# Patient Record
Sex: FEMALE | Race: WHITE | HISPANIC OR LATINO | Employment: FULL TIME | URBAN - METROPOLITAN AREA
[De-identification: names, ages, dates, MRNs, and addresses within clinical notes are randomized per-mention and may not be internally consistent; named-entity substitution may affect disease eponyms.]

---

## 2017-08-22 ENCOUNTER — HOSPITAL ENCOUNTER (EMERGENCY)
Facility: HOSPITAL | Age: 47
Discharge: HOME | End: 2017-08-22
Admitting: EMERGENCY MEDICINE

## 2017-08-22 VITALS
SYSTOLIC BLOOD PRESSURE: 132 MMHG | RESPIRATION RATE: 18 BRPM | HEART RATE: 59 BPM | OXYGEN SATURATION: 99 % | TEMPERATURE: 98.4 F | DIASTOLIC BLOOD PRESSURE: 87 MMHG | WEIGHT: 147.05 LBS

## 2017-08-22 DIAGNOSIS — R21 RASH AND OTHER NONSPECIFIC SKIN ERUPTION: Primary | ICD-10-CM

## 2017-08-22 PROCEDURE — 99282 EMERGENCY DEPT VISIT SF MDM: CPT

## 2017-08-22 RX ORDER — DIPHENHYDRAMINE HCL 25 MG
25 TABLET ORAL EVERY 6 HOURS PRN
Qty: 30 TABLET | Refills: 0 | Status: SHIPPED | OUTPATIENT
Start: 2017-08-22 | End: 2017-09-18

## 2017-09-18 ENCOUNTER — APPOINTMENT (EMERGENCY)
Dept: RADIOLOGY | Facility: HOSPITAL | Age: 47
End: 2017-09-18

## 2017-09-18 ENCOUNTER — HOSPITAL ENCOUNTER (EMERGENCY)
Facility: HOSPITAL | Age: 47
Discharge: HOME | End: 2017-09-19
Attending: EMERGENCY MEDICINE | Admitting: EMERGENCY MEDICINE

## 2017-09-18 DIAGNOSIS — R10.9 ABDOMINAL PAIN: Primary | ICD-10-CM

## 2017-09-18 DIAGNOSIS — R07.9 CHEST PAIN: ICD-10-CM

## 2017-09-18 LAB
ALBUMIN SERPL BCP-MCNC: 3.6 G/DL (ref 3.5–5)
ALP SERPL-CCNC: 56 U/L (ref 46–116)
ALT SERPL W P-5'-P-CCNC: 81 U/L (ref 12–78)
ANION GAP SERPL CALCULATED.3IONS-SCNC: 7 MMOL/L (ref 4–13)
AST SERPL W P-5'-P-CCNC: 69 U/L (ref 5–45)
BASOPHILS # BLD AUTO: 0.02 THOUSANDS/ΜL (ref 0–0.1)
BASOPHILS NFR BLD AUTO: 0 % (ref 0–1)
BILIRUB SERPL-MCNC: 0.43 MG/DL (ref 0.2–1)
BILIRUB UR QL STRIP: NEGATIVE
BUN SERPL-MCNC: 13 MG/DL (ref 5–25)
CALCIUM SERPL-MCNC: 9 MG/DL (ref 8.3–10.1)
CHLORIDE SERPL-SCNC: 103 MMOL/L (ref 100–108)
CLARITY UR: ABNORMAL
CO2 SERPL-SCNC: 28 MMOL/L (ref 21–32)
COLOR UR: YELLOW
COLOR, POC: YELLOW
CREAT SERPL-MCNC: 0.9 MG/DL (ref 0.6–1.3)
EOSINOPHIL # BLD AUTO: 0.1 THOUSAND/ΜL (ref 0–0.61)
EOSINOPHIL NFR BLD AUTO: 1 % (ref 0–6)
ERYTHROCYTE [DISTWIDTH] IN BLOOD BY AUTOMATED COUNT: 12.2 % (ref 11.6–15.1)
GFR SERPL CREATININE-BSD FRML MDRD: 77 ML/MIN/1.73SQ M
GLUCOSE SERPL-MCNC: 90 MG/DL (ref 65–140)
GLUCOSE UR STRIP-MCNC: NEGATIVE MG/DL
HCT VFR BLD AUTO: 40.6 % (ref 34.8–46.1)
HGB BLD-MCNC: 14.1 G/DL (ref 11.5–15.4)
HGB UR QL STRIP.AUTO: ABNORMAL
KETONES UR STRIP-MCNC: NEGATIVE MG/DL
LEUKOCYTE ESTERASE UR QL STRIP: NEGATIVE
LYMPHOCYTES # BLD AUTO: 3.06 THOUSANDS/ΜL (ref 0.6–4.47)
LYMPHOCYTES NFR BLD AUTO: 43 % (ref 14–44)
MCH RBC QN AUTO: 32 PG (ref 26.8–34.3)
MCHC RBC AUTO-ENTMCNC: 34.7 G/DL (ref 31.4–37.4)
MCV RBC AUTO: 92 FL (ref 82–98)
MONOCYTES # BLD AUTO: 0.55 THOUSAND/ΜL (ref 0.17–1.22)
MONOCYTES NFR BLD AUTO: 8 % (ref 4–12)
NEUTROPHILS # BLD AUTO: 3.44 THOUSANDS/ΜL (ref 1.85–7.62)
NEUTS SEG NFR BLD AUTO: 48 % (ref 43–75)
NITRITE UR QL STRIP: NEGATIVE
NRBC BLD AUTO-RTO: 0 /100 WBCS
PH UR STRIP.AUTO: 7.5 [PH] (ref 4.5–8)
PLATELET # BLD AUTO: 185 THOUSANDS/UL (ref 149–390)
PMV BLD AUTO: 10.6 FL (ref 8.9–12.7)
POTASSIUM SERPL-SCNC: 3.8 MMOL/L (ref 3.5–5.3)
PROT SERPL-MCNC: 7.9 G/DL (ref 6.4–8.2)
PROT UR STRIP-MCNC: ABNORMAL MG/DL
RBC # BLD AUTO: 4.41 MILLION/UL (ref 3.81–5.12)
SODIUM SERPL-SCNC: 138 MMOL/L (ref 136–145)
SP GR UR STRIP.AUTO: 1.02 (ref 1–1.03)
SPECIMEN SOURCE: NORMAL
TROPONIN I BLD-MCNC: 0 NG/ML (ref 0–0.08)
UROBILINOGEN UR QL STRIP.AUTO: 1 E.U./DL
WBC # BLD AUTO: 7.18 THOUSAND/UL (ref 4.31–10.16)

## 2017-09-18 PROCEDURE — 85652 RBC SED RATE AUTOMATED: CPT | Performed by: EMERGENCY MEDICINE

## 2017-09-18 PROCEDURE — 81002 URINALYSIS NONAUTO W/O SCOPE: CPT | Performed by: EMERGENCY MEDICINE

## 2017-09-18 PROCEDURE — 81001 URINALYSIS AUTO W/SCOPE: CPT

## 2017-09-18 PROCEDURE — 74176 CT ABD & PELVIS W/O CONTRAST: CPT

## 2017-09-18 PROCEDURE — 84484 ASSAY OF TROPONIN QUANT: CPT

## 2017-09-18 PROCEDURE — 93005 ELECTROCARDIOGRAM TRACING: CPT

## 2017-09-18 PROCEDURE — 96361 HYDRATE IV INFUSION ADD-ON: CPT

## 2017-09-18 PROCEDURE — 80053 COMPREHEN METABOLIC PANEL: CPT | Performed by: EMERGENCY MEDICINE

## 2017-09-18 PROCEDURE — 36415 COLL VENOUS BLD VENIPUNCTURE: CPT | Performed by: EMERGENCY MEDICINE

## 2017-09-18 PROCEDURE — 96374 THER/PROPH/DIAG INJ IV PUSH: CPT

## 2017-09-18 PROCEDURE — 85025 COMPLETE CBC W/AUTO DIFF WBC: CPT | Performed by: EMERGENCY MEDICINE

## 2017-09-18 RX ORDER — KETOROLAC TROMETHAMINE 30 MG/ML
15 INJECTION, SOLUTION INTRAMUSCULAR; INTRAVENOUS ONCE
Status: COMPLETED | OUTPATIENT
Start: 2017-09-18 | End: 2017-09-18

## 2017-09-18 RX ADMIN — KETOROLAC TROMETHAMINE 15 MG: 30 INJECTION, SOLUTION INTRAMUSCULAR at 23:01

## 2017-09-18 RX ADMIN — SODIUM CHLORIDE 1000 ML: 0.9 INJECTION, SOLUTION INTRAVENOUS at 23:02

## 2017-09-19 VITALS
WEIGHT: 147 LBS | RESPIRATION RATE: 16 BRPM | HEART RATE: 65 BPM | DIASTOLIC BLOOD PRESSURE: 82 MMHG | SYSTOLIC BLOOD PRESSURE: 165 MMHG | TEMPERATURE: 97.4 F | OXYGEN SATURATION: 99 %

## 2017-09-19 LAB
AMORPH URATE CRY URNS QL MICRO: NORMAL /HPF
ATRIAL RATE: 57 BPM
BACTERIA UR QL AUTO: NORMAL /HPF
ERYTHROCYTE [SEDIMENTATION RATE] IN BLOOD: 7 MM/HOUR (ref 0–20)
NON-SQ EPI CELLS URNS QL MICRO: NORMAL /HPF
P AXIS: 41 DEGREES
PR INTERVAL: 150 MS
QRS AXIS: 44 DEGREES
QRSD INTERVAL: 82 MS
QT INTERVAL: 426 MS
QTC INTERVAL: 414 MS
RBC #/AREA URNS AUTO: NORMAL /HPF
T WAVE AXIS: 33 DEGREES
VENTRICULAR RATE: 57 BPM
WBC #/AREA URNS AUTO: NORMAL /HPF

## 2017-09-19 PROCEDURE — 96361 HYDRATE IV INFUSION ADD-ON: CPT

## 2017-09-19 PROCEDURE — 99284 EMERGENCY DEPT VISIT MOD MDM: CPT

## 2017-12-28 ENCOUNTER — HOSPITAL ENCOUNTER (EMERGENCY)
Facility: HOSPITAL | Age: 47
Discharge: HOME | End: 2017-12-28
Attending: EMERGENCY MEDICINE | Admitting: EMERGENCY MEDICINE
Payer: COMMERCIAL

## 2017-12-28 VITALS
SYSTOLIC BLOOD PRESSURE: 164 MMHG | OXYGEN SATURATION: 100 % | DIASTOLIC BLOOD PRESSURE: 84 MMHG | RESPIRATION RATE: 16 BRPM | WEIGHT: 157 LBS | TEMPERATURE: 98.3 F | HEART RATE: 58 BPM

## 2017-12-28 DIAGNOSIS — Z77.29 CARBON MONOXIDE EXPOSURE: Primary | ICD-10-CM

## 2017-12-28 LAB
BASE EX.OXY STD BLDV CALC-SCNC: 80.1 % (ref 60–80)
BASE EXCESS BLDV CALC-SCNC: 0 MMOL/L
GAS + CO PNL BLDA: 2.2 % (ref 0–1.5)
HCO3 BLDV-SCNC: 25.8 MMOL/L (ref 24–30)
O2 CT BLDV-SCNC: 16.6 ML/DL
PCO2 BLDV: 46.2 MM HG (ref 42–50)
PH BLDV: 7.37 [PH] (ref 7.3–7.4)
PO2 BLDV: 46.2 MM HG (ref 35–45)

## 2017-12-28 PROCEDURE — 99283 EMERGENCY DEPT VISIT LOW MDM: CPT

## 2017-12-28 PROCEDURE — 82805 BLOOD GASES W/O2 SATURATION: CPT | Performed by: EMERGENCY MEDICINE

## 2017-12-28 PROCEDURE — 82375 ASSAY CARBOXYHB QUANT: CPT | Performed by: EMERGENCY MEDICINE

## 2017-12-28 PROCEDURE — 36415 COLL VENOUS BLD VENIPUNCTURE: CPT | Performed by: EMERGENCY MEDICINE

## 2017-12-28 NOTE — DISCHARGE INSTRUCTIONS
Carbon Monoxide Poisoning   WHAT YOU NEED TO KNOW:   Carbon monoxide (CO) poisoning is a life-threatening condition caused by exposure to high levels of CO  Your brain, organs, and tissues can be damaged from a lack of oxygen  You will need to watch for new signs and symptoms for several weeks or months after treatment  DISCHARGE INSTRUCTIONS:   Call 911 if:   · You have chest pain or an irregular or fast heartbeat  · You or someone close to you has a seizure or is unconscious  Return to the emergency department if:   · You have trouble breathing or are breathing faster than usual     · You feel like you are going to faint  · You feel weak, have trouble moving, or have severe muscle pain  · Your urine becomes dark or red  Contact your healthcare provider if:   · You feel dizzy  · You have a headache or start to vomit  · Your eyesight becomes blurred  · You have questions or concerns about your condition or care  What to do if you think you or someone else was exposed to CO:  CO poisoning can seem like the flu  Anyone who may have been exposed to CO needs to be checked by a healthcare provider  The following are steps to take if you believe you or someone else is near a source of CO:  · Move into fresh air  If safely possible, shut off the source of the CO  Wait for a professional to help you if you cannot do this safely  · Call 911  Explain when the exposure happened and how long you think it lasted  · Start CPR if needed and you are trained on how to do this  CPR may be needed if the person is not breathing  Prevent CO poisoning:   · Install a CO detector in every sleeping area in your home  Place it 5 feet above the floor and away from fireplaces or gas-burning equipment  Change the batteries twice each year  · Check your chimney, furnace, or wood stoves  Check for problems every year before you use them  Have your fireplace flue cleaned on a regular basis       · Be careful with gas appliances  Do not use barbecues or heaters that burn fuel inside your home or other closed spaces  Do not use your gas kitchen oven to heat your home  Make sure appliances are properly hooded or vented  · Do not let motor vehicles run in closed areas  This includes letting your car run in a garage  If the car is outside, check that the exhaust pipe is not blocked  · Do not smoke  Cigarette smoke contains small amounts of CO  This increases your risk of CO poisoning if you are exposed to a source of CO  Ask your healthcare provider for information if you need help quitting  Follow up with your healthcare provider as directed: You may need to return to have more tests  Write down your questions so you remember to ask them during your visits  © 2017 [address] [employer] Information is for End User's use only and may not be sold, redistributed or otherwise used for commercial purposes  All illustrations and images included in CareNotes® are the copyrighted property of A D A M , Inc  or Ray Schroeder  The above information is an  only  It is not intended as medical advice for individual conditions or treatments  Talk to your doctor, nurse or pharmacist before following any medical regimen to see if it is safe and effective for you

## 2017-12-28 NOTE — ED PROVIDER NOTES
Thank you theHistory  Chief Complaint   Patient presents with   Floydene Tomas Monoxide Exposure     [facility] was at place of residence and left them a note stating that there is high levels of carbon monoxide (300PPM)  Pt c/o headache, light headedness and chest pain     HPI    47 yof carbon monooxide exposure  [facility] called after she smelled gas for a few days  Felt lightheaded and congested  No HA  No sob  No AMS  Gas was shut off  Advised to have carbon monoxide levels checked  A/P: check carbon monoxide levels  Oxygen  None       Past Medical History:   Diagnosis Date    Patient denies medical problems        Past Surgical History:   Procedure Laterality Date    APPENDECTOMY      CHOLECYSTECTOMY      HYSTERECTOMY      OVARY SURGERY         History reviewed  No pertinent family history  I have reviewed and agree with the history as documented  Social History   Substance Use Topics    Smoking status: Never Smoker    Smokeless tobacco: Never Used    Alcohol use Yes      Comment: social        Review of Systems   Constitutional: Negative for chills, fatigue and fever  Eyes: Negative for photophobia and visual disturbance  Respiratory: Negative for cough and shortness of breath  Cardiovascular: Negative for chest pain, palpitations and leg swelling  Gastrointestinal: Negative for diarrhea, nausea and vomiting  Endocrine: Negative for polydipsia and polyuria  Genitourinary: Negative for decreased urine volume, difficulty urinating, dysuria and frequency  Musculoskeletal: Negative for back pain, neck pain and neck stiffness  Skin: Negative for color change and rash  Allergic/Immunologic: Negative for environmental allergies and immunocompromised state  Neurological: Positive for light-headedness  Negative for dizziness and headaches  Hematological: Negative for adenopathy  Does not bruise/bleed easily  Psychiatric/Behavioral: Negative for dysphoric mood   The patient is not nervous/anxious  Physical Exam  ED Triage Vitals   Temperature Pulse Respirations Blood Pressure SpO2   12/28/17 1210 12/28/17 1210 12/28/17 1210 12/28/17 1210 12/28/17 1210   98 3 °F (36 8 °C) 68 20 147/77 98 %      Temp Source Heart Rate Source Patient Position - Orthostatic VS BP Location FiO2 (%)   12/28/17 1210 12/28/17 1330 -- -- --   Oral Monitor         Pain Score       12/28/17 1210       No Pain           Orthostatic Vital Signs  Vitals:    12/28/17 1210 12/28/17 1330   BP: 147/77 164/84   Pulse: 68 58       Physical Exam   Constitutional: She is oriented to person, place, and time  She appears well-developed and well-nourished  No distress  HENT:   Head: Normocephalic and atraumatic  Nose: Nose normal    Eyes: Conjunctivae and EOM are normal  Pupils are equal, round, and reactive to light  No scleral icterus  Neck: Normal range of motion  Neck supple  No JVD present  No tracheal deviation present  No thyromegaly present  Cardiovascular: Normal rate, regular rhythm, normal heart sounds and intact distal pulses  Exam reveals no gallop and no friction rub  Pulmonary/Chest: Effort normal and breath sounds normal  No respiratory distress  She has no wheezes  She has no rales  She exhibits no tenderness  Abdominal: Soft  Bowel sounds are normal  She exhibits no distension and no mass  There is no tenderness  There is no rebound and no guarding  No hernia  Musculoskeletal: Normal range of motion  She exhibits no edema, tenderness or deformity  Neurological: She is alert and oriented to person, place, and time  She has normal reflexes  No cranial nerve deficit  Coordination normal    Skin: Skin is warm and dry  She is not diaphoretic  No erythema  Psychiatric: She has a normal mood and affect  Her behavior is normal    Nursing note and vitals reviewed        ED Medications  Medications - No data to display    Diagnostic Studies  Results Reviewed     Procedure Component Value Units Date/Time    Carboxyhemoglobin [[id number]]  (Abnormal) Collected:  12/28/17 1321    Lab Status:  Final result Specimen:  Blood from Arm, Left Updated:  12/28/17 1326     Carbon Monoxide, Blood 2 2 (H) %     Narrative:         Normal Carboxyhemoglobin range for nonsmokers is <1 5%   Normal Carboxyhemoglobin range for smokers is 1 5% to 5 1%     Blood gas, venous [[id number]]  (Abnormal) Collected:  12/28/17 1259    Lab Status:  Final result Specimen:  Blood from Arm, Left Updated:  12/28/17 1307     pH, Rolando 7 365     pCO2, Rolando 46 2 mm Hg      pO2, Rolando 46 2 (H) mm Hg      HCO3, Rolando 25 8 mmol/L      Base Excess, Rolando 0 0 mmol/L      O2 Content, Rolando 16 6 ml/dL      O2 HGB, VENOUS 80 1 (H) %                  No orders to display         Procedures  Procedures      Phone Consults  ED Phone Contact    ED Course  ED Course                                MDM  Number of Diagnoses or Management Options  Carbon monoxide exposure: new and requires workup  Diagnosis management comments: Normal carboxyhemoglobin    CritCare Time    Disposition  Final diagnoses:   Carbon monoxide exposure     Time reflects when diagnosis was documented in both MDM as applicable and the Disposition within this note     Time User Action Codes Description Comment    12/28/2017  1:33 PM Vernon Hoople Add [Z77 29] Carbon monoxide exposure       ED Disposition     ED Disposition Condition Comment    Discharge  [facility] discharge to home/self care  Condition at discharge: Follow-up Information     Follow up With Specialties Details Why Contact Toribio    Jaqueline Guy, [address] [facility]  [phone]          There are no discharge medications for this patient  No discharge procedures on file  ED Provider  Attending physically available and evaluated [facility]  I managed the patient along with the ED Attending      Electronically Signed by         Nash Martinez DO  Resident  12/30/17 6565

## 2017-12-28 NOTE — ED ATTENDING ATTESTATION
Tamia Mendiola MD, saw and evaluated the patient  I have discussed the patient with the resident/non-physician practitioner and agree with the resident's/non-physician practitioner's findings, Plan of Care, and MDM as documented in the resident's/non-physician practitioner's note, except where noted  All available labs and Radiology studies were reviewed  At this point I agree with the current assessment done in the Emergency Department  I have conducted an independent evaluation of this patient a history and physical is as follows:  Lightheaded and congestion for a couple of weeks worse past few days Pt has gas leak in house   Measurement high today Pt is nonsmoker and denies cp sob or neuro co PE; alert nad heart reg lungs clear abd soft nontender ext nad neuro nonfocal MDM: will do cohgb and O2    Critical Care Time  CritCare Time    Procedures

## 2018-01-13 ENCOUNTER — TRANSCRIBE ORDERS (OUTPATIENT)
Dept: RADIOLOGY | Facility: HOSPITAL | Age: 48
End: 2018-01-13

## 2018-01-13 ENCOUNTER — HOSPITAL ENCOUNTER (OUTPATIENT)
Dept: RADIOLOGY | Facility: HOSPITAL | Age: 48
Discharge: HOME | End: 2018-01-13

## 2018-01-13 ENCOUNTER — GENERIC CONVERSION - ENCOUNTER (OUTPATIENT)
Dept: OTHER | Facility: OTHER | Age: 48
End: 2018-01-13

## 2018-01-13 DIAGNOSIS — M25.561 RIGHT KNEE PAIN, UNSPECIFIED CHRONICITY: ICD-10-CM

## 2018-01-13 DIAGNOSIS — M25.561 RIGHT KNEE PAIN, UNSPECIFIED CHRONICITY: Primary | ICD-10-CM

## 2018-01-13 PROCEDURE — 73562 X-RAY EXAM OF KNEE 3: CPT

## 2018-01-15 ENCOUNTER — HOSPITAL ENCOUNTER (EMERGENCY)
Facility: HOSPITAL | Age: 48
Discharge: HOME | End: 2018-01-15
Attending: EMERGENCY MEDICINE | Admitting: EMERGENCY MEDICINE
Payer: COMMERCIAL

## 2018-01-15 ENCOUNTER — APPOINTMENT (EMERGENCY)
Dept: RADIOLOGY | Facility: HOSPITAL | Age: 48
End: 2018-01-15
Payer: COMMERCIAL

## 2018-01-15 VITALS
TEMPERATURE: 98.4 F | HEART RATE: 54 BPM | WEIGHT: 155 LBS | SYSTOLIC BLOOD PRESSURE: 124 MMHG | DIASTOLIC BLOOD PRESSURE: 74 MMHG | OXYGEN SATURATION: 98 % | BODY MASS INDEX: 27.46 KG/M2 | RESPIRATION RATE: 15 BRPM

## 2018-01-15 DIAGNOSIS — R07.9 CHEST PAIN: Primary | ICD-10-CM

## 2018-01-15 LAB
ALBUMIN SERPL BCP-MCNC: 4.2 G/DL (ref 3.5–5)
ALP SERPL-CCNC: 56 U/L (ref 46–116)
ALT SERPL W P-5'-P-CCNC: 124 U/L (ref 12–78)
ANION GAP SERPL CALCULATED.3IONS-SCNC: 5 MMOL/L (ref 4–13)
AST SERPL W P-5'-P-CCNC: 86 U/L (ref 5–45)
ATRIAL RATE: 64 BPM
BASOPHILS # BLD AUTO: 0.03 THOUSANDS/ΜL (ref 0–0.1)
BASOPHILS NFR BLD AUTO: 0 % (ref 0–1)
BILIRUB SERPL-MCNC: 0.46 MG/DL (ref 0.2–1)
BUN SERPL-MCNC: 14 MG/DL (ref 5–25)
CALCIUM SERPL-MCNC: 9.8 MG/DL (ref 8.3–10.1)
CHLORIDE SERPL-SCNC: 105 MMOL/L (ref 100–108)
CO2 SERPL-SCNC: 28 MMOL/L (ref 21–32)
CREAT SERPL-MCNC: 0.86 MG/DL (ref 0.6–1.3)
DEPRECATED D DIMER PPP: <220 NG/ML (FEU) (ref 0–424)
EOSINOPHIL # BLD AUTO: 0.1 THOUSAND/ΜL (ref 0–0.61)
EOSINOPHIL NFR BLD AUTO: 2 % (ref 0–6)
ERYTHROCYTE [DISTWIDTH] IN BLOOD BY AUTOMATED COUNT: 11.7 % (ref 11.6–15.1)
GFR SERPL CREATININE-BSD FRML MDRD: 81 ML/MIN/1.73SQ M
GLUCOSE SERPL-MCNC: 83 MG/DL (ref 65–140)
HCT VFR BLD AUTO: 43.6 % (ref 34.8–46.1)
HGB BLD-MCNC: 15.3 G/DL (ref 11.5–15.4)
LYMPHOCYTES # BLD AUTO: 3.08 THOUSANDS/ΜL (ref 0.6–4.47)
LYMPHOCYTES NFR BLD AUTO: 45 % (ref 14–44)
MCH RBC QN AUTO: 32.6 PG (ref 26.8–34.3)
MCHC RBC AUTO-ENTMCNC: 35.1 G/DL (ref 31.4–37.4)
MCV RBC AUTO: 93 FL (ref 82–98)
MONOCYTES # BLD AUTO: 0.59 THOUSAND/ΜL (ref 0.17–1.22)
MONOCYTES NFR BLD AUTO: 9 % (ref 4–12)
NEUTROPHILS # BLD AUTO: 3.07 THOUSANDS/ΜL (ref 1.85–7.62)
NEUTS SEG NFR BLD AUTO: 44 % (ref 43–75)
NRBC BLD AUTO-RTO: 0 /100 WBCS
P AXIS: 33 DEGREES
PLATELET # BLD AUTO: 225 THOUSANDS/UL (ref 149–390)
PMV BLD AUTO: 10.5 FL (ref 8.9–12.7)
POTASSIUM SERPL-SCNC: 3.9 MMOL/L (ref 3.5–5.3)
PR INTERVAL: 134 MS
PROT SERPL-MCNC: 8.8 G/DL (ref 6.4–8.2)
QRS AXIS: 53 DEGREES
QRSD INTERVAL: 88 MS
QT INTERVAL: 410 MS
QTC INTERVAL: 422 MS
RBC # BLD AUTO: 4.69 MILLION/UL (ref 3.81–5.12)
SODIUM SERPL-SCNC: 138 MMOL/L (ref 136–145)
T WAVE AXIS: 30 DEGREES
TROPONIN I SERPL-MCNC: <0.02 NG/ML
VENTRICULAR RATE: 64 BPM
WBC # BLD AUTO: 6.88 THOUSAND/UL (ref 4.31–10.16)

## 2018-01-15 PROCEDURE — 71046 X-RAY EXAM CHEST 2 VIEWS: CPT

## 2018-01-15 PROCEDURE — 85379 FIBRIN DEGRADATION QUANT: CPT | Performed by: EMERGENCY MEDICINE

## 2018-01-15 PROCEDURE — 85025 COMPLETE CBC W/AUTO DIFF WBC: CPT | Performed by: EMERGENCY MEDICINE

## 2018-01-15 PROCEDURE — 96374 THER/PROPH/DIAG INJ IV PUSH: CPT

## 2018-01-15 PROCEDURE — 96372 THER/PROPH/DIAG INJ SC/IM: CPT

## 2018-01-15 PROCEDURE — 84484 ASSAY OF TROPONIN QUANT: CPT | Performed by: EMERGENCY MEDICINE

## 2018-01-15 PROCEDURE — 80053 COMPREHEN METABOLIC PANEL: CPT | Performed by: EMERGENCY MEDICINE

## 2018-01-15 PROCEDURE — 93005 ELECTROCARDIOGRAM TRACING: CPT | Performed by: EMERGENCY MEDICINE

## 2018-01-15 PROCEDURE — 36415 COLL VENOUS BLD VENIPUNCTURE: CPT

## 2018-01-15 PROCEDURE — 99285 EMERGENCY DEPT VISIT HI MDM: CPT

## 2018-01-15 RX ORDER — ONDANSETRON 2 MG/ML
4 INJECTION INTRAMUSCULAR; INTRAVENOUS ONCE
Status: COMPLETED | OUTPATIENT
Start: 2018-01-15 | End: 2018-01-15

## 2018-01-15 RX ORDER — IBUPROFEN 600 MG/1
600 TABLET ORAL EVERY 6 HOURS PRN
Qty: 30 TABLET | Refills: 0 | Status: SHIPPED | OUTPATIENT
Start: 2018-01-15 | End: 2018-02-09

## 2018-01-15 RX ORDER — ASPIRIN 81 MG/1
324 TABLET, CHEWABLE ORAL ONCE
Status: COMPLETED | OUTPATIENT
Start: 2018-01-15 | End: 2018-01-15

## 2018-01-15 RX ORDER — KETOROLAC TROMETHAMINE 30 MG/ML
15 INJECTION, SOLUTION INTRAMUSCULAR; INTRAVENOUS ONCE
Status: COMPLETED | OUTPATIENT
Start: 2018-01-15 | End: 2018-01-15

## 2018-01-15 RX ADMIN — ONDANSETRON 4 MG: 2 INJECTION INTRAMUSCULAR; INTRAVENOUS at 16:01

## 2018-01-15 RX ADMIN — ASPIRIN 81 MG 324 MG: 81 TABLET ORAL at 16:03

## 2018-01-15 RX ADMIN — KETOROLAC TROMETHAMINE 15 MG: 30 INJECTION, SOLUTION INTRAMUSCULAR at 16:03

## 2018-01-15 NOTE — DISCHARGE INSTRUCTIONS

## 2018-01-15 NOTE — ED ATTENDING ATTESTATION
Coral Norman MD, saw and evaluated the patient  I have discussed the patient with the resident/non-physician practitioner and agree with the resident's/non-physician practitioner's findings, Plan of Care, and MDM as documented in the resident's/non-physician practitioner's note, except where noted  All available labs and Radiology studies were reviewed  At this point I agree with the current assessment done in the Emergency Department  I have conducted an independent evaluation of this patient a history and physical is as follows: This 41-year-old female presents with two day history of  Left upper chest wall pain radiating up to her neck  Patient describes the pain as sharp, constant, waxing and waning  Patient states taking a deep breath or movement seems to make it worse  Patient states that when she walks around she also begins to feel short of breath  Patient denies any swelling to the arms or legs, trauma to the chest, cough or fevers  On exam patient does appear uncomfortable  She has positive chest wall tenderness over the left approximately 3rd rib with regular heart sounds and clear lung sounds bilaterally  There is no cervical spine tenderness although there is some left-sided paraspinal tenderness  There is no lower extremity edema  Patient's EKG is unremarkable, will be evaluated for cardiac and PE causes  Patient disposition pending results of the studies  Patient's evaluation here is unremarkable  Patient was discharged home    Critical Care Time  CritCare Time    Procedures

## 2018-01-15 NOTE — ED PROVIDER NOTES
History  Chief Complaint   Patient presents with    Chest Pain     Pt states that "I feel really light headed and my chest is starting to hurt and it is going up into my nck " Pt c/o sharp, shooting chest pain      52-year-old female with no pertinent past medical history presents emergency department for chest pain x1 day  Patient states that when she awoke today ago she started developed left-sided chest pain with radiation to the left jaw  Patient states the pain is exacerbated with walking as well as deep inhalation  Pain is described as 7/10 localized to left chest wall with radiation to left jaw that is reproducible with palpation  Patient states she has associated nausea  Patient denies hemoptysis, unilateral leg swelling, history DVTs/PEs, history of cancers, history of immobilization recently, family history of early coronary artery disease, palpitations, dyspnea, shortness of breath, fever, chills  History provided by:  Patient      None       Past Medical History:   Diagnosis Date    Patient denies medical problems        Past Surgical History:   Procedure Laterality Date    APPENDECTOMY      CHOLECYSTECTOMY      HYSTERECTOMY      OVARY SURGERY         History reviewed  No pertinent family history  I have reviewed and agree with the history as documented  Social History   Substance Use Topics    Smoking status: Never Smoker    Smokeless tobacco: Never Used    Alcohol use Yes      Comment: social        Review of Systems   Constitutional: Negative for chills, diaphoresis, fatigue and fever  HENT: Negative for congestion, ear discharge, facial swelling, hearing loss, rhinorrhea, sinus pain, sinus pressure, sneezing, sore throat, tinnitus and trouble swallowing  Eyes: Negative for pain, discharge and redness  Respiratory: Negative for cough, choking, chest tightness, shortness of breath, wheezing and stridor  Cardiovascular: Positive for chest pain   Negative for palpitations and leg swelling  Gastrointestinal: Negative for abdominal distention, abdominal pain, blood in stool, constipation, diarrhea, nausea and vomiting  Endocrine: Negative for cold intolerance, polydipsia and polyuria  Genitourinary: Negative for difficulty urinating, dysuria, enuresis, flank pain, frequency and hematuria  Musculoskeletal: Negative for arthralgias, back pain, gait problem and neck stiffness  Skin: Negative for rash and wound  Neurological: Negative for dizziness, seizures, syncope, weakness, numbness and headaches  Hematological: Negative for adenopathy  Psychiatric/Behavioral: Negative for agitation, confusion, hallucinations, sleep disturbance and suicidal ideas  All other systems reviewed and are negative  Physical Exam  ED Triage Vitals [01/15/18 1448]   Temperature Pulse Respirations Blood Pressure SpO2   98 4 °F (36 9 °C) 71 18 156/83 100 %      Temp Source Heart Rate Source Patient Position - Orthostatic VS BP Location FiO2 (%)   Oral Monitor Sitting Left arm --      Pain Score       Worst Possible Pain           Orthostatic Vital Signs  Vitals:    01/15/18 1448 01/15/18 1600 01/15/18 1615   BP: 156/83 146/83 124/74   Pulse: 71 (!) 54 (!) 54   Patient Position - Orthostatic VS: Sitting         Physical Exam   Constitutional: She is oriented to person, place, and time  She appears well-developed and well-nourished  No distress  HENT:   Head: Normocephalic and atraumatic  Right Ear: External ear normal    Left Ear: External ear normal    Nose: No sinus tenderness  No epistaxis  Mouth/Throat: No oropharyngeal exudate  Eyes: Conjunctivae and EOM are normal  Pupils are equal, round, and reactive to light  Right eye exhibits no discharge  Left eye exhibits no discharge  Neck: No JVD present  Cardiovascular: Normal rate, regular rhythm, normal heart sounds and intact distal pulses  Exam reveals no gallop and no friction rub      No murmur heard   Pulmonary/Chest: Effort normal and breath sounds normal  No stridor  No respiratory distress  She has no wheezes  She has no rales  Abdominal: Soft  Bowel sounds are normal  She exhibits no distension and no mass  There is no tenderness  There is no rebound and no guarding  Genitourinary: Rectal exam shows guaiac negative stool  Musculoskeletal: Normal range of motion  She exhibits no edema or deformity  Arms:  Lymphadenopathy:     She has no cervical adenopathy  Neurological: She is alert and oriented to person, place, and time  Skin: Skin is warm, dry and intact  Capillary refill takes less than 2 seconds  She is not diaphoretic  Psychiatric: She has a normal mood and affect  Her speech is normal and behavior is normal  Judgment and thought content normal    Nursing note and vitals reviewed        ED Medications  Medications   aspirin chewable tablet 324 mg (324 mg Oral Given 1/15/18 1603)   ondansetron (ZOFRAN) injection 4 mg (4 mg Intravenous Given 1/15/18 1601)   ketorolac (TORADOL) injection 15 mg (15 mg Intramuscular Given 1/15/18 1603)       Diagnostic Studies  Results Reviewed     Procedure Component Value Units Date/Time    D-dimer, quantitative [[id number]]  (Normal) Collected:  01/15/18 1501    Lab Status:  Final result Specimen:  Blood from Arm, Right Updated:  01/15/18 1643     D-Dimer, Quant <220 ng/ml (FEU)     Comprehensive metabolic panel [[id number]]  (Abnormal) Collected:  01/15/18 1501    Lab Status:  Final result Specimen:  Blood from Arm, Right Updated:  01/15/18 1546     Sodium 138 mmol/L      Potassium 3 9 mmol/L      Chloride 105 mmol/L      CO2 28 mmol/L      Anion Gap 5 mmol/L      BUN 14 mg/dL      Creatinine 0 86 mg/dL      Glucose 83 mg/dL      Calcium 9 8 mg/dL      AST 86 (H) U/L       (H) U/L      Alkaline Phosphatase 56 U/L      Total Protein 8 8 (H) g/dL      Albumin 4 2 g/dL      Total Bilirubin 0 46 mg/dL      eGFR 81 ml/min/1 73sq m     Narrative: National Kidney Disease Education Program recommendations are as follows:  GFR calculation is accurate only with a steady state creatinine  Chronic Kidney disease less than 60 ml/min/1 73 sq  meters  Kidney failure less than 15 ml/min/1 73 sq  meters  Troponin I [[id number]]  (Normal) Collected:  01/15/18 1501    Lab Status:  Final result Specimen:  Blood from Arm, Right Updated:  01/15/18 1545     Troponin I <0 02 ng/mL     Narrative:         [employer] Chemistry analyzer 99% cutoff is > 0 04 ng/mL in network labs    o cTnI 99% cutoff is useful only when applied to patients in the clinical setting of myocardial ischemia  o cTnI 99% cutoff should be interpreted in the context of clinical history, ECG findings and possibly cardiac imaging to establish correct diagnosis  o cTnI 99% cutoff may be suggestive but clearly not indicative of a coronary event without the clinical setting of myocardial ischemia  CBC and differential [[id number]]  (Abnormal) Collected:  01/15/18 1501    Lab Status:  Final result Specimen:  Blood from Arm, Right Updated:  01/15/18 1518     WBC 6 88 Thousand/uL      RBC 4 69 Million/uL      Hemoglobin 15 3 g/dL      Hematocrit 43 6 %      MCV 93 fL      MCH 32 6 pg      MCHC 35 1 g/dL      RDW 11 7 %      MPV 10 5 fL      Platelets 084 Thousands/uL      nRBC 0 /100 WBCs      Neutrophils Relative 44 %      Lymphocytes Relative 45 (H) %      Monocytes Relative 9 %      Eosinophils Relative 2 %      Basophils Relative 0 %      Neutrophils Absolute 3 07 Thousands/µL      Lymphocytes Absolute 3 08 Thousands/µL      Monocytes Absolute 0 59 Thousand/µL      Eosinophils Absolute 0 10 Thousand/µL      Basophils Absolute 0 03 Thousands/µL                  X-ray chest 2 views   ED Interpretation by Shelle Kanner Espland, DO (01/15 1700)      No active pulmonary disease  Workstation performed: [id number]         Final Result by Janna Aden DO (01/15 1654)      No active pulmonary disease  Workstation performed: [id number]               Procedures  ECG 12 Lead Documentation  Date/Time: 1/15/2018 3:58 PM  Performed by: Katherine Ordoñez  Authorized by: Lupe Sheffield     Indications / Diagnosis:  Chest pain  Patient location:  ED  Previous ECG:     Previous ECG:  Compared to current    Comparison ECG info:  [id number]    Similarity:  No change    Comparison to cardiac monitor: Yes    Interpretation:     Interpretation: normal    Rate:     ECG rate:  64    ECG rate assessment: normal    Rhythm:     Rhythm: sinus rhythm    Ectopy:     Ectopy: none    QRS:     QRS axis:  Normal    QRS intervals:  Normal  Conduction:     Conduction: normal    ST segments:     ST segments:  Normal  T waves:     T waves: normal            Phone Consults  ED Phone Contact    ED Course  ED Course                                MDM  Number of Diagnoses or Management Options  Chest pain: new and requires workup  Diagnosis management comments: 41-year-old female presents with chest pain x1 day as reproducible with palpation  Heart score 1  [place],  D-dimer neg  Patient given 15 mg IV the Toradol, 324 aspirin, 4 mg IV Zofran  1   Chest wall pain  -600 Motrin Q6hrs  -f/u with PCP    CritCare Time    Disposition  Final diagnoses:   Chest pain     Time reflects when diagnosis was documented in both MDM as applicable and the Disposition within this note     Time User Action Codes Description Comment    1/15/2018  5:00 PM Melina Chaudhary Left Add [R07 9] Chest pain       ED Disposition     ED Disposition Condition Comment    Discharge  [facility] discharge to home/self care      Condition at discharge: Good        Follow-up Information     Follow up With Specialties Details Why Contact Info    Frank Ballesteros, [address] [facility],[address] [place] [address]  [phone]        Call          Patient's Medications   Discharge Prescriptions    IBUPROFEN (MOTRIN) 600 MG TABLET    Take 1 tablet by mouth every 6 (six) hours as needed for moderate pain       Start Date: 1/15/2018 End Date: --       Order Dose: 600 mg       Quantity: 30 tablet    Refills: 0     No discharge procedures on file  ED Provider  Attending physically available and evaluated Og Zhou I managed the patient along with the ED Attending      Electronically Signed by         Brice Baker DO  01/15/18 8394

## 2018-01-19 ENCOUNTER — ALLSCRIPTS OFFICE VISIT (OUTPATIENT)
Dept: OTHER | Facility: OTHER | Age: 48
End: 2018-01-19

## 2018-01-20 NOTE — PROGRESS NOTES
Assessment   1  Right knee pain (469 57) (M25 561)    Plan   Right knee pain    · Triamcinolone Acetonide 40 MG/ML Injection Suspension (Kenalog)    Discussion/Summary   Patient discussion: discussed with the patient, 30 minute visit, greater than half of the time was spent on counseling  Explained my current clinical findings to Josse today  I have some suspicion of a potential medial meniscal tear  Though, she does not have any significant mechanical symptoms at this time  She received a right knee intra-articular cortisone injection on today's visit  I'll see her back in about 3 weeks' time for clinical reassessment in this regard  If she does continue with significant discomfort of her right knee we will consider doing an MRI for further evaluation  The patient was counseled regarding diagnostic results,-- instructions for management,-- risk factor reductions,-- prognosis,-- patient and family education,-- impressions,-- risks and benefits of treatment options  Possible side effects of new medications were reviewed with the patient/guardian today  The treatment plan was reviewed with the patient/guardian  The patient/guardian understands and agrees with the treatment plan      Chief Complaint   1  Knee Pain  Right knee pain      History of Present Illness   HPI: Josse is a 44-year-old lady who's here today for evaluation of right knee pain of approximately 2 months duration  Denies any history of trauma prior to the onset of her right knee pain  Reports that this of moderate to severe intensity and is present intermittently  Mostly present on the anteromedial aspect and the medial aspect of the right knee with no significant radiation  It is made worse with twisting-type motion of her right knee and it occasionally wakes her up from sleep as well   Does report getting some swelling intermittently of her right knee but denies any associated redness or warmth of the knee or any other systemic symptoms like fever, chills  Denies any history of similar symptoms in the left knee and denies any history of skin rash or tick bites in the past  She has had a plain radiograph of the right knee and this does not reveal any acute fracture or dislocation  Review of Systems        Constitutional: No fever, no chills, feels well, no tiredness, no recent weight gain or loss  Eyes: No complaints of eyesight problems, no red eyes  ENT: no loss of hearing, no nosebleeds, no sore throat  Cardiovascular: No complaints of chest pain, no palpitations, no leg claudication or lower extremity edema  Respiratory: no compliants of shortness of breath, no wheezing, no cough  Gastrointestinal: nausea  Genitourinary: no complaints of dysuria, no incontinence  Musculoskeletal: as noted in HPI  Integumentary: no complaints of skin rash or lesion, no itching or dry skin, no skin wounds  Neurological: dizziness  Endocrine: No complaints of muscle weakness, no feelings of weakness, no frequent urination, no excessive thirst       Psychiatric: No suicidal thoughts, no anxiety, no feelings of depression  Active Problems   1  Abnormal findings on diagnostic imaging of urinary organs (793 5) (R93 49)   2  Diarrhea (787 91) (R19 7)   3  Nausea (787 02) (R11 0)   4  Pelvic and perineal pain (625 9) (R10 2)   5  Pelvic mass (789 30) (R19 00)   6  Postoperative examination (V67 00) (Z09)   7  Right knee pain (719 46) (M25 561)   8  Symptomatic menopausal or female climacteric states (627 2) (N95 1)    Past Medical History    · History of Helicobacter pylori (H  pylori) infection (041 86) (A04 8)   · Normal delivery (650) (O80,Z37  9)   · History of Summary Of Previous Pregnancies  7  (Total No )   · History of Summary Of Previous Pregnancies Para 7  (Deliveries)     The active problems and past medical history were reviewed and updated today        Surgical History    · History of Appendectomy   · History of Cholecystectomy Laparoscopic   · History of Laparoscopy With Total Hysterectomy   · History of Lysis Of Peritoneal Adhesions   · History of Salpingectomy   · History of Salpingo-oophorectomy Left Side     The surgical history was reviewed and updated today  Family History   Mother    · No pertinent family history  Grandmother    · Family history of arthritis (V17 7) (Z82 61)   · Family history of malignant neoplasm (V16 9) (Z80 9)   · Family history of osteoporosis (V17 81) (Z82 62)  Paternal Grandmother    · Family history of Ovarian Cancer (V16 41)     The family history was reviewed and updated today  Social History    · Being A Social Drinker   · Never A Smoker  The social history was reviewed and updated today  The social history was reviewed and is unchanged  Current Meds    1  Diclofenac Sodium 25 MG Oral Tablet Delayed Release; Therapy: (Recorded:19Jan2018) to Recorded   2  HM Iron TABS; Therapy: (Recorded:09Apr2014) to Recorded     The medication list was reviewed and updated today  Allergies   1  Codeine Derivatives   2  Morphine Derivatives    Vitals   Signs   Heart Rate: 65  Systolic: 789  Diastolic: 85  Height: 5 ft 3 in  Weight: 155 lb   BMI Calculated: 27 46  BSA Calculated: 1 74    Physical Exam      Right Knee: Appearance: Normal  Tenderness: diffuse anteromedial aspect-- and-- medial joint line  Flexion: painful restricted AROM 80Â° degrees  Motor: diffuse weakness  Special Test: equivocal medial Apley's Grind test-- and-- Further test was deferred due to patient's discomfort, but-- negative patellar grind,-- negative patellofemoral apprehension test,-- negative lateral Apley's Grind test,-- negative Lachman's test,-- no laxity on Valgus Stress-- and-- no laxity on Varus Stress  Constitutional - General appearance: Normal       Musculoskeletal - Gait and station: Abnormal  Gait evaluation demonstrated antalgia on the right  Cardiovascular - Pulses: Normal -- Examination of extremities for edema and/or varicosities: Normal       Skin - Skin and subcutaneous tissue: Normal       Neurologic - Cranial nerves: Normal -- Sensation: Normal -- Lower extremity peripheral neuro exam: Normal       Psychiatric - Orientation to person, place, and time: Normal -- Mood and affect: Normal       Eyes      Conjunctiva and lids: Normal        Pupils and irises: Normal        Results/Data   I personally reviewed the films/images/results in the office today  My interpretation follows  X-ray Review As noted in the history of present illness  Procedure      Procedure: Injection of the right knee joint  Indication:  Joint pain-- and-- Diagnostic  Potential complications include bleeding,-- infection-- and-- allergic reaction  Risk, benefits and alternatives were discussed with the patient  Verbal consent was obtained prior to the procedure  Alcohol, Betadine and Chlorhexidine Gluconate  was used to prep the area  ethyl chloride spray was used as a topical anesthetic  Using sterile technique, the aspiration/injection needle was then directed from a Anterolateral aspect  A 22-gauge and 1 5 inch needle was used to inject 4 mL of 1% Lidocaine-- and-- 1 mL of 40mg/mL triamcinolone  A bandage was applied  the patient tolerated the procedure well  Complications: none  Follow-up in the office in 3 week(s)  Signatures    Electronically signed by :  QUINTIN Encinas ; Jan 19 2018  1:20PM EST                       (Author)

## 2018-01-22 VITALS
DIASTOLIC BLOOD PRESSURE: 85 MMHG | BODY MASS INDEX: 27.46 KG/M2 | HEIGHT: 63 IN | HEART RATE: 65 BPM | WEIGHT: 155 LBS | SYSTOLIC BLOOD PRESSURE: 138 MMHG

## 2018-01-30 ENCOUNTER — OFFICE VISIT (OUTPATIENT)
Dept: OBGYN CLINIC | Facility: OTHER | Age: 48
End: 2018-01-30
Payer: COMMERCIAL

## 2018-01-30 VITALS
DIASTOLIC BLOOD PRESSURE: 89 MMHG | HEART RATE: 67 BPM | SYSTOLIC BLOOD PRESSURE: 134 MMHG | HEIGHT: 64 IN | BODY MASS INDEX: 26.29 KG/M2 | WEIGHT: 154 LBS

## 2018-01-30 DIAGNOSIS — M25.561 CHRONIC PAIN OF RIGHT KNEE: ICD-10-CM

## 2018-01-30 DIAGNOSIS — G89.29 CHRONIC PAIN OF RIGHT KNEE: ICD-10-CM

## 2018-01-30 DIAGNOSIS — M25.461 EFFUSION OF RIGHT KNEE: Primary | ICD-10-CM

## 2018-01-30 PROCEDURE — 99213 OFFICE O/P EST LOW 20 MIN: CPT | Performed by: ORTHOPAEDIC SURGERY

## 2018-01-30 NOTE — PROGRESS NOTES
Assessment:       1  Effusion of right knee    2  Chronic pain of right knee          Plan:        Persistent significant pain of the right knee with some effusion despite conservative management including right knee cortisone injection  We'll request a right knee MRI for further evaluation and to exclude an associated meniscal tear  Subjective:     Patient ID: Jodi Brown is a 53 y o  female  Chief Complaint:    Luke Young is here today for a follow-up of her right knee pain  She was last seen in this regard about 3 weeks ago and received a right knee cortisone injection for knee pain  She has had this pain for over 2 months  It is made worse with twisting activities and no radiation  Today, she says of the pain is more diffuse but most prominently in the medial aspect  She also reports some swelling of her right knee joint  She denies any erythema of the right knee  No other associated joint pain at this time  Her right knee pain is made worse with all types of knee movement  No other acute interval development of symptoms in this regard since the last office visit  Currently her pain severity is severe  Social History     Occupational History    Not on file  Social History Main Topics    Smoking status: Never Smoker    Smokeless tobacco: Never Used    Alcohol use Yes      Comment: social    Drug use: No    Sexual activity: Not on file      Review of Systems   Constitutional: Negative  HENT: Negative  Eyes: Negative  Respiratory: Negative  Cardiovascular: Negative  Endocrine: Negative  Skin: Negative  Allergic/Immunologic: Negative  Neurological: Negative  Psychiatric/Behavioral: Negative  Objective:     Right Knee Exam     Tenderness   Right knee tenderness location: Diffuse tenderness to palpation of the right knee even with light touch      Range of Motion   Extension: normal   Flexion:  20 abnormal     Other   Erythema: absent  Right knee sensation: Increased tenderness to palpation even with light touch  Pulse: present  Swelling: mild  Other tests: effusion present    Comments:  Further special tests of the right knee were deferred due to patient's discomfort        Physical Exam   Constitutional: She is oriented to person, place, and time  She appears well-developed  HENT:   Head: Normocephalic  Eyes: Conjunctivae are normal    Cardiovascular: Normal rate  Pulmonary/Chest: Effort normal  No respiratory distress  Musculoskeletal:        Right knee: She exhibits effusion  Neurological: She is alert and oriented to person, place, and time  Skin: Skin is warm and dry  No rash noted  No erythema  Psychiatric: She has a normal mood and affect   Judgment normal

## 2018-02-05 ENCOUNTER — HOSPITAL ENCOUNTER (OUTPATIENT)
Dept: RADIOLOGY | Facility: HOSPITAL | Age: 48
Discharge: HOME | End: 2018-02-05
Attending: ORTHOPAEDIC SURGERY
Payer: COMMERCIAL

## 2018-02-05 DIAGNOSIS — G89.29 CHRONIC PAIN OF RIGHT KNEE: ICD-10-CM

## 2018-02-05 DIAGNOSIS — M25.561 CHRONIC PAIN OF RIGHT KNEE: ICD-10-CM

## 2018-02-05 DIAGNOSIS — M25.461 EFFUSION OF RIGHT KNEE: ICD-10-CM

## 2018-02-05 PROCEDURE — 73721 MRI JNT OF LWR EXTRE W/O DYE: CPT

## 2018-02-09 ENCOUNTER — OFFICE VISIT (OUTPATIENT)
Dept: OBGYN CLINIC | Facility: OTHER | Age: 48
End: 2018-02-09
Payer: COMMERCIAL

## 2018-02-09 VITALS
BODY MASS INDEX: 26.29 KG/M2 | WEIGHT: 154 LBS | HEIGHT: 64 IN | HEART RATE: 65 BPM | SYSTOLIC BLOOD PRESSURE: 119 MMHG | DIASTOLIC BLOOD PRESSURE: 83 MMHG

## 2018-02-09 DIAGNOSIS — M25.561 CHRONIC PAIN OF RIGHT KNEE: Primary | ICD-10-CM

## 2018-02-09 DIAGNOSIS — G89.29 CHRONIC PAIN OF RIGHT KNEE: Primary | ICD-10-CM

## 2018-02-09 PROCEDURE — 99214 OFFICE O/P EST MOD 30 MIN: CPT | Performed by: ORTHOPAEDIC SURGERY

## 2018-02-09 RX ORDER — MELOXICAM 7.5 MG/1
7.5 TABLET ORAL DAILY
Qty: 14 TABLET | Refills: 1 | Status: SHIPPED | OUTPATIENT
Start: 2018-02-09 | End: 2018-03-12 | Stop reason: SDUPTHER

## 2018-02-09 RX ORDER — OMEPRAZOLE 40 MG/1
40 CAPSULE, DELAYED RELEASE ORAL DAILY
Qty: 14 CAPSULE | Refills: 1 | Status: SHIPPED | OUTPATIENT
Start: 2018-02-09 | End: 2020-09-27 | Stop reason: ALTCHOICE

## 2018-02-09 NOTE — PROGRESS NOTES
Assessment/Plan:  Assessment/Plan   Diagnoses and all orders for this visit:    Chronic pain of right knee  -     omeprazole (PriLOSEC) 40 MG capsule; Take 1 capsule (40 mg total) by mouth daily for 14 days  -     meloxicam (MOBIC) 7 5 mg tablet; Take 1 tablet (7 5 mg total) by mouth daily  -     Ambulatory referral to Physical Therapy; Future    Discussed with Belgica Ortiz that her MRI is significant for degenerative fissuring of the medial facet of the patella  Recommended intervention is formal physical therapy with focus on quad strengthening, hamstring stretching and strengthening the comma hip abductors strengthening, and core stability training to promote is stabilized gait mechanics and improved patellofemoral joint association  She will be seen for follow-up in 2 months time  If her symptoms fail to improve, or worsen, the next consideration would be for viscosupplementation  Subjective:   Patient ID: Genaro Tejada is a 53 y o  female  Belgica Ortiz is a pleasant 50-year-old female who presents today for follow-up on MRI  On today's presentation she reports continued pain with ambulation and weight-bearing, intermittent generalized swelling with increased activity, 9/10 pain  She denies bruising, numbness, tingling, or instability  She reports very minimal relief with ibuprofen as needed  And she denies any relief from her previous CS injection  The following portions of the patient's history were reviewed and updated as appropriate: allergies, current medications, past family history, past medical history, past social history, past surgical history and problem list     Review of Systems   Constitutional: Negative  HENT: Negative  Eyes: Negative  Respiratory: Negative  Cardiovascular: Negative  Gastrointestinal: Negative  Endocrine: Negative  Genitourinary: Negative  Musculoskeletal:        As noted in HPI/PE   Skin: Negative  Allergic/Immunologic: Negative      Neurological: Negative  Hematological: Negative  Psychiatric/Behavioral: Negative  Objective:  Right Knee Exam     Comments:  No obvious deformity noted  Tender to palpate over medial border of patella  Exhibits generalized sensitivity to light touch over medial aspect of knee as well as anteromedial aspect of knee joint  Active ROM:  0° to 90°, with pain beyond 30° of flexion  5/5 MMT seated knee extension, 4/5 MMT seated knee flexion     -varus stress test  -valgus stress test  -Rohit's medial compartment  -Rohit's lateral compartment            Physical Exam   Constitutional: She is oriented to person, place, and time  She appears well-developed and well-nourished  HENT:   Right Ear: External ear normal    Left Ear: External ear normal    Nose: Nose normal    Eyes: Conjunctivae and EOM are normal  Pupils are equal, round, and reactive to light  Neck: Normal range of motion  Cardiovascular: Intact distal pulses  Pulmonary/Chest: Effort normal    Neurological: She is alert and oriented to person, place, and time  Skin: Skin is warm and dry  Psychiatric: She has a normal mood and affect  Thought content normal        MRI taken 2/5/2018 significant for fissuring of the medial patellar facet

## 2018-02-20 ENCOUNTER — EVALUATION (OUTPATIENT)
Dept: PHYSICAL THERAPY | Facility: OTHER | Age: 48
End: 2018-02-20
Payer: COMMERCIAL

## 2018-02-20 DIAGNOSIS — G89.29 CHRONIC PAIN OF RIGHT KNEE: Primary | ICD-10-CM

## 2018-02-20 DIAGNOSIS — M25.561 CHRONIC PAIN OF RIGHT KNEE: Primary | ICD-10-CM

## 2018-02-20 PROCEDURE — G8979 MOBILITY GOAL STATUS: HCPCS | Performed by: PHYSICAL THERAPIST

## 2018-02-20 PROCEDURE — 97140 MANUAL THERAPY 1/> REGIONS: CPT | Performed by: PHYSICAL THERAPIST

## 2018-02-20 PROCEDURE — 97161 PT EVAL LOW COMPLEX 20 MIN: CPT | Performed by: PHYSICAL THERAPIST

## 2018-02-20 PROCEDURE — G8978 MOBILITY CURRENT STATUS: HCPCS | Performed by: PHYSICAL THERAPIST

## 2018-02-20 NOTE — PROGRESS NOTES
PT Evaluation     Today's date: 2018  Patient name: Tamia Malik  : 1970  MRN: [id number]  Referring provider: Rashaun Pinzon MD  Dx:   Encounter Diagnosis     ICD-10-CM    1  Chronic pain of right knee M25 561     G89 29                   Assessment  Impairments: abnormal gait, abnormal or restricted ROM, abnormal movement, activity intolerance, impaired balance, impaired physical strength, lacks appropriate home exercise program, pain with function and weight-bearing intolerance    Assessment details: Tamia Malik is a 53 y o  female who presents with complaints of chronic pain of right knee  (primary encounter diagnosis)  No further referral appears necessary at this time based upon examination results  Patient presents to PT with impaired strength, impaired ROM, decreased flexibility and impaired ability to complete IADLs  Prognosis is good given HEP compliance and PT 2-3x/wk  Positive prognostic indicators include positive attitude toward recovery  Please contact me if you have any questions or recommendations  Thank you for the opportunity to share in  [place] care       Understanding of Dx/Px/POC: good   Prognosis: good    Goals  Short Term:  Pt will report decreased levels of pain by at least 2 subjective ratings in 4 weeks  Pt will demonstrate improved ROM by at least 10 degrees in 4 weeks  Pt will demonstrate improved strength by 1/2 grade  Long Term:   Pt will be independent in their HEP in 8 weeks  Pt will be be pain free with IADL's  Pt will demonstrate improved FOTO score   Patient's Goals:   "I want to strengthen the muscles of the R knee"    Plan  Planned modality interventions: cryotherapy, TENS and thermotherapy: hydrocollator packs  Planned therapy interventions: abdominal trunk stabilization, balance, home exercise program, therapeutic exercise, therapeutic activities, stretching, strengthening, neuromuscular re-education, manual therapy, joint mobilization, gait training, functional ROM exercises and flexibility  Frequency: 2x week  Duration in weeks: 8  Treatment plan discussed with: patient        Subjective Evaluation    History of Present Illness  Onset date: 2017  Mechanism of injury: Patient reports the pain began approximately 5 months ago  She is a  and  so she is constantly on her feet  She told PT that her knee has progressively worsened  She said there was no single event that led to this but she believes her job and her recent weight gain  Not a recurrent problem Quality of life: fair    Pain  Current pain rating: 10  At best pain ratin  At worst pain rating: 10  Location: right knee  Quality: burning  Relieving factors: ice, relaxation and rest  Aggravating factors: stair climbing, walking, sitting and running      Diagnostic Tests  X-ray: normal  MRI studies: abnormal  Treatments  Previous treatment: chiropractic, injection treatment and medication  Current treatment: physical therapy  Patient Goals  Patient goals for therapy: decreased edema, decreased pain, improved balance, increased motion, increased strength, independence with ADLs/IADLs and return to sport/leisure activities  Patient goal: "I want to strengthen the muscles of the R knee"        Objective     Active Range of Motion   Left Ankle/Foot   Normal active range of motion    Right Ankle/Foot   Normal active range of motion    Passive Range of Motion   Left Knee   Flexion: 135 degrees   Extension: -5 degrees     Right Knee   Flexion: 90 degrees   Extension: 3 degrees   Left Ankle/Foot  Normal passive range of motion    Right Ankle/Foot  Normal passive range of motion    Mobility   Patellar Mobility:   Left Knee   WFL: medial, lateral, superior and inferior       Right Knee   WFL: superior and inferior  Hypermobile: medial   Hypomobile: lateral     Additional Mobility Details  Pain with all patellar mobs on the R     Patellar Static Positioning   Left Knee: [facility] Knee: sarah    Strength/Myotome Testing     Left Hip   Planes of Motion   Flexion: 5  Extension: 4+  Abduction: 4+    Isolated Muscles   Gluteus karine: 4+    Right Hip   Planes of Motion   Flexion: 3  Extension: 3  Abduction: 3    Isolated Muscles   Gluteus maximums: 3    Left Knee   Flexion: 3+  Extension: 5    Right Knee   Flexion: 3+  Extension: 3+    Left Ankle/Foot   Normal strength    Right Ankle/Foot   Normal strength    Tests     Left Hip   Positive Ely's  Negative piriformis  Right Hip   Positive Ely's and piriformis  General Comments     Hip Comments   R hip abd PROM is restricted    Knee Comments  Patient has swelling in the R knee; PT did not take a measurement but it was definitely noticeable          Flowsheet Rows    Flowsheet Row Most Recent Value   PT/OT G-Codes   Current Score  1   Projected Score  42   FOTO information reviewed  Yes   Assessment Type  Evaluation   G code set  Mobility: Walking & Moving Around   Mobility: Walking and Moving Around Current Status ()  CM   Mobility: Walking and Moving Around Goal Status ()  CK          Precautions: arthritis, back pain     Daily Treatment Diary     Manual  2/20         FR Lindsay taping                                            Exercise Diary  2/20         TA contraction          p-ball crushers          Clamshells           SLR x 4          Prone bent knee hip ext          [facility]          Single Leg Stance (SLS)          SLS on foam          SLS w/ ball toss          STAR Sliders          Step Up          Lat Step Ups          Curve/Bike          Alter G          Hamstring strap stretch          Quad strap stretch          IT Band Strap Stretch          Slantboard          Rockerboard: Balance          BIODEX                                  Modalities

## 2018-02-22 ENCOUNTER — OFFICE VISIT (OUTPATIENT)
Dept: PHYSICAL THERAPY | Facility: OTHER | Age: 48
End: 2018-02-22
Payer: COMMERCIAL

## 2018-02-22 DIAGNOSIS — M25.561 CHRONIC PAIN OF RIGHT KNEE: Primary | ICD-10-CM

## 2018-02-22 DIAGNOSIS — G89.29 CHRONIC PAIN OF RIGHT KNEE: Primary | ICD-10-CM

## 2018-02-22 PROCEDURE — 97140 MANUAL THERAPY 1/> REGIONS: CPT | Performed by: PEDIATRICS

## 2018-02-22 PROCEDURE — 97112 NEUROMUSCULAR REEDUCATION: CPT | Performed by: PEDIATRICS

## 2018-02-22 PROCEDURE — 97110 THERAPEUTIC EXERCISES: CPT | Performed by: PEDIATRICS

## 2018-02-22 NOTE — PROGRESS NOTES
Daily Note     Today's date: 2018  Patient name: Rosalba Damon  : 1970  MRN: [id number]  Referring provider: Adelina Chapman MD  Dx:   Encounter Diagnosis     ICD-10-CM    1  Chronic pain of right knee M25 561     G89 29                   Subjective: Pt  Rates her pain 10/10 at start of treatment session  She states "it hurts to walk "      Objective: See treatment diary below  Precautions: arthritis, back pain     Daily Treatment Diary     Manual           FR Lindsay taping          Patellar mobs                                  Exercise Diary           TA contraction          p-ball crushers 5" x 10         Clamshells  10         SLR x 4 10 ea         Prone bent knee hip ext 10         Bridges  10         Monster Walks          Single Leg Stance (SLS)          SLS on foam          SLS w/ ball toss          STAR Sliders          Step Up          Lat Step Ups          Curve/Bike 30         Alter G 40         Hamstring strap stretch 10" x 10         Quad strap stretch 10" x 10         IT Band Strap Stretch 10" x 10         Slantboard          Rockerboard: Balance          BIODEX                                  Modalities           laser 4'                                   Assessment: Tolerated treatment fair  Increased pain noted with majority of exercises performed  Performed table exercises only today secondary to increased pain  Attempt to progress reps next visit  Responded well to laser  Plan: Progress treatment as tolerated

## 2018-02-26 ENCOUNTER — OFFICE VISIT (OUTPATIENT)
Dept: PHYSICAL THERAPY | Facility: OTHER | Age: 48
End: 2018-02-26
Payer: COMMERCIAL

## 2018-02-26 DIAGNOSIS — M25.561 CHRONIC PAIN OF RIGHT KNEE: Primary | ICD-10-CM

## 2018-02-26 DIAGNOSIS — G89.29 CHRONIC PAIN OF RIGHT KNEE: Primary | ICD-10-CM

## 2018-02-26 PROCEDURE — 97140 MANUAL THERAPY 1/> REGIONS: CPT | Performed by: PHYSICAL THERAPIST

## 2018-02-26 PROCEDURE — 97110 THERAPEUTIC EXERCISES: CPT | Performed by: PHYSICAL THERAPIST

## 2018-02-26 PROCEDURE — 97112 NEUROMUSCULAR REEDUCATION: CPT | Performed by: PHYSICAL THERAPIST

## 2018-02-26 NOTE — PROGRESS NOTES
Daily Note     Today's date: 2018  Patient name: Denny Woody  : 1970  MRN: [id number]  Referring provider: Elpidio French MD  Dx:   Encounter Diagnosis     ICD-10-CM    1  Chronic pain of right knee M25 561     G89 29      1 on 1 entire duration  Start Time: 730  Stop Time: 0845  Total time in clinic (min): 75 minutes    Subjective: Patient said "my knee hurts when I walk "  She did not rate her pain  She told PT she felt better after PT performed manuals listed below  Objective: See treatment diary below    Precautions: arthritis, back pain     Daily Treatment Diary     Manual          FR  [facility]        Lindsay taping          Patellar [facility]        Cyriax distracton  [facility]        tib fib [facility]        PROM  [facility]        "push" [facility]            Exercise Diary          PPT   15 x 5"         p-ball crushers 5" x 10 15 x 5"        Clamshells  10 NP        SLR x 4 10 ea 2 x 10 ea        Prone bent knee hip ext 10 1 x 10        Bridges  10 NP        Monster Walks          Single Leg Stance (SLS)          SLS on foam          SLS w/ ball toss          STAR Sliders          Step Up          Lat Step Ups          Curve/Bike 10 5'         Alter G 40 NP        Hamstring strap stretch 10" x 10 10 x 10"         Quad strap stretch 10" x 10 10 x 10"         IT Band Strap Stretch 10" x 10 10 x 10"         Slantboard  10 x 10"         Rockerboard: Balance          BIODEX                                  Modalities           laser 4'         [address]   10'                     Assessment: Tolerated treatment fair  PT session concluded with moist heat for 10'  Patient reports increased pain at the end of prescribed exercises  Continue with encouragement but monitor pain  She is doing well regardless of what appears to be low pain tolerance  Plan: Progress treatment as tolerated

## 2018-03-01 ENCOUNTER — APPOINTMENT (OUTPATIENT)
Dept: PHYSICAL THERAPY | Facility: OTHER | Age: 48
End: 2018-03-01
Payer: COMMERCIAL

## 2018-03-05 ENCOUNTER — APPOINTMENT (OUTPATIENT)
Dept: PHYSICAL THERAPY | Facility: OTHER | Age: 48
End: 2018-03-05
Payer: COMMERCIAL

## 2018-03-08 ENCOUNTER — APPOINTMENT (OUTPATIENT)
Dept: PHYSICAL THERAPY | Facility: OTHER | Age: 48
End: 2018-03-08
Payer: COMMERCIAL

## 2018-03-12 ENCOUNTER — OFFICE VISIT (OUTPATIENT)
Dept: OBGYN CLINIC | Facility: OTHER | Age: 48
End: 2018-03-12
Payer: COMMERCIAL

## 2018-03-12 ENCOUNTER — APPOINTMENT (OUTPATIENT)
Dept: PHYSICAL THERAPY | Facility: OTHER | Age: 48
End: 2018-03-12
Payer: COMMERCIAL

## 2018-03-12 VITALS
DIASTOLIC BLOOD PRESSURE: 79 MMHG | SYSTOLIC BLOOD PRESSURE: 120 MMHG | HEART RATE: 60 BPM | WEIGHT: 158 LBS | HEIGHT: 64 IN | BODY MASS INDEX: 26.98 KG/M2

## 2018-03-12 DIAGNOSIS — G89.29 CHRONIC PAIN OF RIGHT KNEE: Primary | ICD-10-CM

## 2018-03-12 DIAGNOSIS — M25.561 CHRONIC PAIN OF RIGHT KNEE: Primary | ICD-10-CM

## 2018-03-12 PROCEDURE — 99213 OFFICE O/P EST LOW 20 MIN: CPT | Performed by: ORTHOPAEDIC SURGERY

## 2018-03-12 RX ORDER — MELOXICAM 7.5 MG/1
7.5 TABLET ORAL DAILY
Qty: 14 TABLET | Refills: 0 | Status: SHIPPED | OUTPATIENT
Start: 2018-03-12 | End: 2020-09-27 | Stop reason: ALTCHOICE

## 2018-03-12 NOTE — PROGRESS NOTES
Assessment/Plan:  Assessment/Plan   Diagnoses and all orders for this visit:    Chronic pain of right knee  -     meloxicam (MOBIC) 7 5 mg tablet; Take 1 tablet (7 5 mg total) by mouth daily         We discussed with Ms Jovan Lr the recommendations for the continued care of her right knee pain with recent MRI showing grade 3 fissure of the subpatellar cartilage  We explained that PT is very important, but she has not been able to tolerate it well  If insurance approves, will recommend visco-supplementation  In the meantime, she should continue to attempt physical therapy with the use of meloxicam   F/u pending insurance approval of euflexxa  Subjective:      Shannon Wyatt is a 53 y o  female presenting for re-evaluation of chronic right knee pain with recent MRI showing subpatellar cartilage fissuring  Since her last visit, she reports having stopped physical therapy after approximately 2 weeks of therapy due to the pain being too strong during exercises there  She has not tried medication to try to control the pain due to dislike of needing to use pills  She primarily notes the pain to be sharp and made significantly worse with prolonged positions, especially sitting  She reports being essentially unable to do stairs  It is greatest along the lateral border of the patella  There is no significant effusion, swelling, clicking, instability noted  The following portions of the patient's history were reviewed and updated as appropriate: allergies, current medications, past family history, past medical history, past social history, past surgical history and problem list     Review of Systems   Musculoskeletal:        As noted in HPI   All other systems reviewed and are negative  Objective:  Right Knee Exam     Tenderness   The patient is experiencing tenderness in the lateral joint line (Tenderness along the lateral border of the patella)      Range of Motion   Extension: normal   Flexion: normal Tests   Rohit:  Medial - negative Lateral - negative  Lachman:  Anterior - negative      Drawer:       Anterior - negative    Posterior - negative  Varus: negative  Valgus: negative    Other   Erythema: absent  Sensation: normal  Pulse: present  Swelling: none  Other tests: no effusion present          Observations     Right Knee   Negative for effusion  Physical Exam   Constitutional: She is oriented to person, place, and time  She appears well-developed and well-nourished  No distress  HENT:   Head: Normocephalic and atraumatic  Eyes: Conjunctivae and EOM are normal  Pupils are equal, round, and reactive to light  Right eye exhibits no discharge  Left eye exhibits no discharge  No scleral icterus  Cardiovascular: Normal rate, regular rhythm and intact distal pulses  Pulmonary/Chest: Effort normal  No respiratory distress  Musculoskeletal:        Right knee: She exhibits no effusion  Neurological: She is alert and oriented to person, place, and time  Skin: Skin is warm and dry  She is not diaphoretic  Psychiatric: She has a normal mood and affect  Her behavior is normal  Judgment and thought content normal    Vitals reviewed  I have personally reviewed pertinent films in PACS

## 2018-03-19 ENCOUNTER — OFFICE VISIT (OUTPATIENT)
Dept: PHYSICAL THERAPY | Facility: OTHER | Age: 48
End: 2018-03-19
Payer: COMMERCIAL

## 2018-03-19 DIAGNOSIS — G89.29 CHRONIC PAIN OF RIGHT KNEE: Primary | ICD-10-CM

## 2018-03-19 DIAGNOSIS — M25.561 CHRONIC PAIN OF RIGHT KNEE: Primary | ICD-10-CM

## 2018-03-19 PROCEDURE — 97140 MANUAL THERAPY 1/> REGIONS: CPT | Performed by: PHYSICAL THERAPIST

## 2018-03-19 PROCEDURE — 97110 THERAPEUTIC EXERCISES: CPT | Performed by: PHYSICAL THERAPIST

## 2018-03-19 PROCEDURE — 97112 NEUROMUSCULAR REEDUCATION: CPT | Performed by: PHYSICAL THERAPIST

## 2018-03-19 NOTE — PROGRESS NOTES
Daily Note     Today's date: 3/19/2018  Patient name: Boyd Bolton  : 1970  MRN: [id number]  Referring provider: Albreto Gomez MD  Dx:   Encounter Diagnosis     ICD-10-CM    1  Chronic pain of right knee M25 561     G89 29      1 on 1 for entire duration  Start Time: 0730  Stop Time: 0830  Total time in clinic (min): 60 minutes    Subjective: Patient came back after seeing doctor  She said she has been going to the gym and she is not limping as much  At the conclusion of today's session, patient reported increased soreness but she felt better than when she came in       Objective: See treatment diary below    Precautions: arthritis, back pain     Daily Treatment Diary     Manual  2/22 2/26 3/19       FR  [facility]       Lindsay taping          Patellar mobs  [facility]        Cyriax distracton  [facility]        tib fib mobs  [facility]        PROM  [facility]        "push" mob  [facility]        GISTM   [facility]           Exercise Diary  2/22 2/26 3/19       PPT   15 x 5"         p-ball crushers 5" x 10 15 x 5"        Clamshells  10 NP        SLR x 4 10 ea 2 x 10 ea        Prone bent knee hip ext 10 1 x 10        Bridges  10 NP        Monster Walks          Single Leg Stance (SLS)          SLS on foam          SLS w/ ball toss          STAR Sliders          Step Up   4"   1x 15       Lat Step Ups   4"   1 x 15       Curve/Bike 10 5'  5'        Alter G 40 NP        Hamstring strap stretch 10" x 10 10 x 10"         Quad strap stretch 10" x 10 10 x 10"         IT Band Strap Stretch 10" x 10 10 x 10"         Slantboard  10 x 10"         Rockerboard: Balance          BIODEX          HR/TR   2x10       Slantboard    10x10"       Wall Sits   (45 degrees)   10x10"           Modalities  2/22 2/26  3/19       laser 4'  [facility]   10'                     Assessment: Tolerated treatment fair  PT added several new exercises  She appears to respond well even though she has a low pain tolerance  Continue to monitor going forward        Plan: Progress treatment as tolerated

## 2018-03-22 ENCOUNTER — APPOINTMENT (OUTPATIENT)
Dept: PHYSICAL THERAPY | Facility: OTHER | Age: 48
End: 2018-03-22
Payer: COMMERCIAL

## 2018-03-26 ENCOUNTER — OFFICE VISIT (OUTPATIENT)
Dept: PHYSICAL THERAPY | Facility: OTHER | Age: 48
End: 2018-03-26
Payer: COMMERCIAL

## 2018-03-26 DIAGNOSIS — M25.561 CHRONIC PAIN OF RIGHT KNEE: Primary | ICD-10-CM

## 2018-03-26 DIAGNOSIS — G89.29 CHRONIC PAIN OF RIGHT KNEE: Primary | ICD-10-CM

## 2018-03-26 PROCEDURE — 97110 THERAPEUTIC EXERCISES: CPT | Performed by: PHYSICAL THERAPIST

## 2018-03-26 PROCEDURE — 97112 NEUROMUSCULAR REEDUCATION: CPT | Performed by: PHYSICAL THERAPIST

## 2018-03-26 PROCEDURE — 97140 MANUAL THERAPY 1/> REGIONS: CPT | Performed by: PHYSICAL THERAPIST

## 2018-03-26 NOTE — PROGRESS NOTES
Daily Note     Today's date: 3/26/2018  Patient name: Nathaly Washburn  : 1970  MRN: [id number]  Referring provider: Jairon Mercado MD  Dx:   Encounter Diagnosis     ICD-10-CM    1  Chronic pain of right knee M25 561     G89 29      1 on 1 for entire duration  Start Time: 0730  Stop Time: 0830  Total time in clinic (min): 60 minutes    Subjective: Patient reports increased pain since last PT session but not because of prescribed exercises  Patient reports decreased pain after PT performed GISTM  She said "I feel better than when I first came in "      Objective: See treatment diary below    Precautions: arthritis, back pain     Daily Treatment Diary     Manual  2/22 2/26 3/19 3/26      FR  [facility]       Lindsay taping          Patellar [facility]        Cyriax distracton  [facility]        tib fib [facility]        PROM  [facility]        "push" [facility]        GISTM   [facility]      Prone Quad Stretch    [facility]          Exercise Diary  2/22 2/26 3/19 3/26      PPT   15 x 5"         p-ball crushers 5" x 10 15 x 5"        Clamshells  10 NP        SLR x 4 10 ea 2 x 10 ea        Prone bent knee hip ext 10 1 x 10        Bridges  10 NP        Monster Walks          Single Leg Stance (SLS)          SLS on foam          SLS w/ ball toss          STAR Sliders          Step Up   4"   1x 15 4"  1 x 15      Lat Step Ups   4"   1 x 15 4"  1 x 15      Curve/Bike 10 5'  5'  5'       Alter G 40 NP        Hamstring strap stretch 10" x 10 10 x 10"   10 x 10"      Quad strap stretch 10" x 10 10 x 10"   10 x 10"      IT Band Strap Stretch 10" x 10 10 x 10"   10 x 10"      Slantboard  10 x 10"   10 x 10"      Rockerboard: Balance          BIODEX          HR/TR   2x10 2 x 10      Slantboard    10x10" 10x10"      Wall Sits   (45 degrees)   10x10" 10x10"          Modalities  2/22 2/26  3/19 3/26      laser 4'  [address] [place] [address]      [facility]   10'                     Assessment: Tolerated treatment fair  She told PT the GISTM made her knee feel much looser    Continue with this practice going forward  Add more exercises per tolerance next visit  Plan: Progress treatment as tolerated

## 2018-03-29 ENCOUNTER — OFFICE VISIT (OUTPATIENT)
Dept: PHYSICAL THERAPY | Facility: OTHER | Age: 48
End: 2018-03-29
Payer: COMMERCIAL

## 2018-03-29 DIAGNOSIS — M25.561 CHRONIC PAIN OF RIGHT KNEE: Primary | ICD-10-CM

## 2018-03-29 DIAGNOSIS — G89.29 CHRONIC PAIN OF RIGHT KNEE: Primary | ICD-10-CM

## 2018-03-29 PROCEDURE — 97140 MANUAL THERAPY 1/> REGIONS: CPT | Performed by: PHYSICAL THERAPIST

## 2018-03-29 PROCEDURE — 97110 THERAPEUTIC EXERCISES: CPT | Performed by: PHYSICAL THERAPIST

## 2018-03-29 PROCEDURE — 97530 THERAPEUTIC ACTIVITIES: CPT | Performed by: PHYSICAL THERAPIST

## 2018-03-29 PROCEDURE — 97112 NEUROMUSCULAR REEDUCATION: CPT | Performed by: PHYSICAL THERAPIST

## 2018-03-29 NOTE — PROGRESS NOTES
Daily Note     Today's date: 3/29/2018  Patient name: Rosalba Damon  : 1970  MRN: [id number]  Referring provider: Adelina Chapman MD  Dx:   Encounter Diagnosis     ICD-10-CM    1  Chronic pain of right knee M25 561     G89 29      1 on 1 for entire duration  Start Time: 0730  Stop Time: 0815  Total time in clinic (min): 45 minutes    Subjective: Patient said that she is feeling better  She reports increased pain post session  However she will be receiving an injection in the near future because her insurance approved the procedure        Objective: See treatment diary below    Precautions: arthritis, back pain     Daily Treatment Diary     Manual  2/22 2/26 3/19 3/26 3/29     FR  [facility]     Lindsay taping          Patellar mobs  [facility]        Cyriax distracton  [facility]        tib fib mobs  [facility]        PROM  [facility]        "push" mob  [facility]        GISTM   Psychiatric     Prone Quad Stretch    [facility]         Exercise Diary  2/22 2/26 3/19 3/26 3/29     PPT   15 x 5"         p-ball crushers 5" x 10 15 x 5"        Clamshells  10 NP        SLR x flexion  SLR x abduction 10 ea 2 x 10 ea   1 x 10 x 3" ea     Prone bent knee hip ext 10 1 x 10        Bridges  10 NP        Monster Walks          Single Leg Stance (SLS)          SLS on foam          SLS w/ ball toss          STAR Sliders          Step Up   4"   1x 15 4"  1 x 15 4"  1 x 10  (P!)     Lat Step Ups   4"   1 x 15 4"  1 x 15 4"  1 x 15     Curve/Bike 10 5'  5'  5'  5'      Alter G 40 NP        Hamstring Glides 10" x 10 10 x 10"   10 x 10" 12 x 3" ea     Quad strap stretch 10" x 10 10 x 10"   10 x 10" NP     IT Band Strap Stretch 10" x 10 10 x 10"   10 x 10" NP     Rockerboard: Balance          BIODEX          HR/TR   2x10 2 x 10 2 x 10     Slantboard    10x10" 10x10" 10 x 10"      Wall Sits   (45 degrees)   10x10" 10x10" Re-Add NV     Squats     Add NV     Hip Add Iso     15 x 5"          Modalities  2/22 2/26  3/19 3/26 3/29     [facility] 4'  [address] [place], [place] [address]ej 75   10' Assessment: Tolerated treatment fair  She told PT the GISTM made her knee feel much looser  PT performed GISTM to the L knee today as well  Continue with this practice going forward  PT added more exercises today and patient appeared to respond favorably  Plan: Progress treatment as tolerated

## 2018-04-02 ENCOUNTER — OFFICE VISIT (OUTPATIENT)
Dept: OBGYN CLINIC | Facility: OTHER | Age: 48
End: 2018-04-02

## 2018-04-02 VITALS
WEIGHT: 158 LBS | HEART RATE: 64 BPM | HEIGHT: 63 IN | DIASTOLIC BLOOD PRESSURE: 84 MMHG | BODY MASS INDEX: 28 KG/M2 | SYSTOLIC BLOOD PRESSURE: 120 MMHG

## 2018-04-02 DIAGNOSIS — G89.29 CHRONIC PAIN OF RIGHT KNEE: Primary | ICD-10-CM

## 2018-04-02 DIAGNOSIS — M25.561 CHRONIC PAIN OF RIGHT KNEE: Primary | ICD-10-CM

## 2018-04-02 PROCEDURE — 20610 DRAIN/INJ JOINT/BURSA W/O US: CPT | Performed by: ORTHOPAEDIC SURGERY

## 2018-04-02 RX ORDER — HYALURONATE SODIUM 10 MG/ML
20 SYRINGE (ML) INTRAARTICULAR
Status: COMPLETED | OUTPATIENT
Start: 2018-04-02 | End: 2018-04-02

## 2018-04-02 RX ADMIN — Medication 20 MG: at 08:12

## 2018-04-02 NOTE — PROGRESS NOTES
Assessment/Plan:  Assessment/Plan   Diagnoses and all orders for this visit:    Chronic pain of right knee           Ms Zarate tolerated her right knee euflexxa injection well today  She has started physical therapy and we recommend to continue to work with them  We will see her back in 1 week for the 2nd injection of her euflexxa series  Subjective:     Mouna Llamas is a 53year old female presenting today for re-evaluation of right knee pain  Her right knee pain has essentially not changed  She continues to notice sharp pain made worse with prolonged positions, such as sitting, or when doing stairs  It is greatest along the lateral border of the patella  She notices clicking occasionally, but denies locking, swelling, or perceived instability  The following portions of the patient's history were reviewed and updated as appropriate: allergies, current medications, past family history, past medical history, past social history, past surgical history and problem list     Review of Systems   Constitutional: Negative for chills, fever and unexpected weight change  HENT: Negative for hearing loss, nosebleeds and sore throat  Eyes: Negative for pain, redness and visual disturbance  Respiratory: Negative for cough, shortness of breath and wheezing  Cardiovascular: Negative for chest pain, palpitations and leg swelling  Gastrointestinal: Negative for abdominal distention, nausea and vomiting  Endocrine: Negative for polydipsia and polyuria  Genitourinary: Negative for dysuria and hematuria  Musculoskeletal:        As noted in HPI   Skin: Negative for rash and wound  Neurological: Negative for dizziness, numbness and headaches  Psychiatric/Behavioral: Negative for decreased concentration  No depression, no anxiety       Objective:  Right Knee Exam     Tenderness   Right knee tenderness location: lateral border of patella      Muscle Strength     The patient has normal right knee strength  Other   Erythema: absent  Sensation: normal  Pulse: present  Swelling: none  Other tests: no effusion present          Observations     Right Knee   Negative for effusion  Physical Exam   Constitutional: She is oriented to person, place, and time  She appears well-developed and well-nourished  No distress  HENT:   Head: Normocephalic and atraumatic  Eyes: Conjunctivae and EOM are normal  Pupils are equal, round, and reactive to light  Right eye exhibits no discharge  Left eye exhibits no discharge  No scleral icterus  Cardiovascular: Normal rate, regular rhythm and intact distal pulses  Pulmonary/Chest: Effort normal  No respiratory distress  Musculoskeletal:        Right knee: She exhibits no effusion  Neurological: She is alert and oriented to person, place, and time  Skin: Skin is warm and dry  She is not diaphoretic  Psychiatric: She has a normal mood and affect  Her behavior is normal  Judgment and thought content normal    Vitals reviewed  Large joint arthrocentesis  Date/Time: 4/2/2018 8:12 AM  Consent given by: patient  Site marked: site marked  Timeout: Immediately prior to procedure a time out was called to verify the correct patient, procedure, equipment, support staff and site/side marked as required   Supporting Documentation  Indications: pain   Procedure Details  Location: knee - R knee  Preparation: Patient was prepped and draped in the usual sterile fashion  Needle size: 22 G  Ultrasound guidance: no  Approach: inferolateral   Medications administered: 20 mg Sodium Hyaluronate 20 MG/2ML    Patient tolerance: patient tolerated the procedure well with no immediate complications  Sterile dressing applied: bandage applied  I have personally reviewed pertinent films in PACS

## 2018-04-05 ENCOUNTER — APPOINTMENT (OUTPATIENT)
Dept: PHYSICAL THERAPY | Facility: OTHER | Age: 48
End: 2018-04-05
Payer: COMMERCIAL

## 2018-04-09 ENCOUNTER — OFFICE VISIT (OUTPATIENT)
Dept: PHYSICAL THERAPY | Facility: OTHER | Age: 48
End: 2018-04-09
Payer: COMMERCIAL

## 2018-04-09 ENCOUNTER — OFFICE VISIT (OUTPATIENT)
Dept: OBGYN CLINIC | Facility: OTHER | Age: 48
End: 2018-04-09

## 2018-04-09 VITALS
HEIGHT: 63 IN | SYSTOLIC BLOOD PRESSURE: 123 MMHG | DIASTOLIC BLOOD PRESSURE: 84 MMHG | WEIGHT: 158 LBS | BODY MASS INDEX: 28 KG/M2 | HEART RATE: 58 BPM

## 2018-04-09 DIAGNOSIS — M25.561 CHRONIC PAIN OF RIGHT KNEE: Primary | ICD-10-CM

## 2018-04-09 DIAGNOSIS — G89.29 CHRONIC PAIN OF RIGHT KNEE: Primary | ICD-10-CM

## 2018-04-09 PROCEDURE — 20610 DRAIN/INJ JOINT/BURSA W/O US: CPT | Performed by: ORTHOPAEDIC SURGERY

## 2018-04-09 PROCEDURE — 97110 THERAPEUTIC EXERCISES: CPT

## 2018-04-09 PROCEDURE — 97140 MANUAL THERAPY 1/> REGIONS: CPT

## 2018-04-09 RX ORDER — HYALURONATE SODIUM 10 MG/ML
20 SYRINGE (ML) INTRAARTICULAR
Status: COMPLETED | OUTPATIENT
Start: 2018-04-09 | End: 2018-04-09

## 2018-04-09 RX ADMIN — Medication 20 MG: at 11:34

## 2018-04-09 NOTE — PROGRESS NOTES
Assessment:       1  Chronic pain of right knee          Plan:        Reviewed my current clinical findings with Mikel Sommers today  She also received a second dose of right knee Euflexa Visco supplementation injection without any immediate complications  I have advised her to continue with right knee range of motion and strengthening exercises  She will be followed up in one week time for her third and last dose of right knee Euflexa injection  Time spent was 25 minutes of which more than half was spent in counseling  Subjective:     Patient ID: Sugar Yan is a 53 y o  female  Chief Complaint:    HPI  Mikel Sommers Is here today for a follow-up of her right knee pain and to receive the second dose of right knee Euflexa Visco supplementation injection  Since her last office visit, she has not yet noted any significant improvement of her knee pain but has not had any worsening of her symptoms either  She did experience one day of right knee pain worsening following her first injection which has since improved  Denies any new swelling or erythema of her right knee and denies any systemic symptoms of fever and chills  Social History     Occupational History    Not on file  Social History Main Topics    Smoking status: Never Smoker    Smokeless tobacco: Never Used    Alcohol use Yes      Comment: social    Drug use: No    Sexual activity: Not on file      Review of Systems   Constitutional: Negative  HENT: Negative  Eyes: Negative  Respiratory: Negative  Cardiovascular: Negative  Gastrointestinal: Negative  Endocrine: Negative  Genitourinary: Negative  Skin: Negative  Allergic/Immunologic: Negative  Neurological: Negative  Hematological: Negative  Psychiatric/Behavioral: Negative  Right knee examination: No swelling or deformity noted  No overlying erythema  No warmth to touch  Discomfort at terminal right knee flexion   Palpable crepitus and tenderness of the patellofemoral joint  No valgus or varus laxity and negative Lachman's  No apprehension  Objective:     Ortho ExamPhysical Exam   Constitutional: She is oriented to person, place, and time  She appears well-developed and well-nourished  HENT:   Head: Normocephalic and atraumatic  Eyes: Conjunctivae are normal  Pupils are equal, round, and reactive to light  Cardiovascular: Normal rate and regular rhythm  Pulmonary/Chest: Effort normal  No respiratory distress  Neurological: She is alert and oriented to person, place, and time  No cranial nerve deficit  Skin: Skin is warm and dry  No erythema  Psychiatric: She has a normal mood and affect   Her behavior is normal  Judgment and thought content normal        Large joint arthrocentesis  Date/Time: 4/9/2018 11:34 AM  Consent given by: patient  Site marked: site marked  Timeout: Immediately prior to procedure a time out was called to verify the correct patient, procedure, equipment, support staff and site/side marked as required   Supporting Documentation  Indications: pain   Procedure Details  Location: knee - R knee  Preparation: Patient was prepped and draped in the usual sterile fashion  Needle size: 22 G  Ultrasound guidance: no  Approach: anterolateral  Medications administered: 20 mg Sodium Hyaluronate 20 MG/2ML    Patient tolerance: patient tolerated the procedure well with no immediate complications  Dressing:  Sterile dressing applied

## 2018-04-09 NOTE — PROGRESS NOTES
Daily Note     Today's date: 2018  Patient name: Lalo Acosta  : 1970  MRN: [id number]  Referring provider: Patti Goodwin MD  Dx:   Encounter Diagnosis     ICD-10-CM    1  Chronic pain of right knee M25 561     G89 29      1:1 with PTA CR entire session  Subjective: Patient received an injection today however MD told her it was still okay to attend PT as long as she felt up to it  Objective: See treatment diary below    Precautions: arthritis, back pain     Daily Treatment Diary     Manual  2/22 2/26 3/19 3/26 3/29 4/9    FR  [facility]     Lindsay taping          Patellar [facility]        Cyriax distracton  [facility]        tib fib [facility]        PROM  [facility]        "push" [facility]        GISTM   [facility] CR    Prone Quad Stretch    [facility] CR        Exercise Diary  2/22 2/26 3/19 3/26 3/29 4/9    PPT   15 x 5"         p-ball crushers 5" x 10 15 x 5"        Clamshells  10 NP        SLR x flexion  SLR x abduction 10 ea 2 x 10 ea   1 x 10 x 3" ea     Prone bent knee hip ext 10 1 x 10        Bridges  10 NP        Monster Walks          Single Leg Stance (SLS)          SLS on foam          SLS w/ ball toss          STAR Sliders          Step Up   4"   1x 15 4"  1 x 15 4"  1 x 10  (P!)     Lat Step Ups   4"   1 x 15 4"  1 x 15 4"  1 x 15     Curve/Bike 10 5'  5'  5'  5'  8 mins    Alter G 40 NP        Hamstring Glides 10" x 10 10 x 10"   10 x 10" 12 x 3" ea 3" x 12 ea       Quad strap stretch 10" x 10 10 x 10"   10 x 10" NP 10"x10    IT Band Strap Stretch 10" x 10 10 x 10"   10 x 10" NP 10"x10    Rockerboard: Balance          BIODEX          HR/TR   2x10 2 x 10 2 x 10     Slantboard    10x10" 10x10" 10 x 10"      Wall Sits   (45 degrees)   10x10" 10x10" Re-Add NV     Squats     Add NV     Hip Add Iso     15 x 5"      Heel slides      10"x10        Modalities  2/22 2/26  3/19 3/26 4/9     laser 4'  [address] [place] [address]     [facility]   10                     Assessment: Light warm up, manuals and self stretches/AAROM only per PT GC due to receiving injection  Plan to resume full POC NV   Plan: Progress treatment as tolerated

## 2018-04-09 NOTE — LETTER
April 9, 2018     Patient: Elías Arellano   YOB: 1970   Date of Visit: 4/9/2018       To Whom it May Concern:    Elías Arellano is under my professional care  She was seen in my office on 4/9/2018  She may return to work on 4/10/18  If you have any questions or concerns, please don't hesitate to call           Sincerely,          Terence Hickman MD        CC: Allaniesha Arellano

## 2018-04-12 ENCOUNTER — OFFICE VISIT (OUTPATIENT)
Dept: PHYSICAL THERAPY | Facility: OTHER | Age: 48
End: 2018-04-12
Payer: COMMERCIAL

## 2018-04-12 DIAGNOSIS — G89.29 CHRONIC PAIN OF RIGHT KNEE: Primary | ICD-10-CM

## 2018-04-12 DIAGNOSIS — M25.561 CHRONIC PAIN OF RIGHT KNEE: Primary | ICD-10-CM

## 2018-04-12 PROCEDURE — 97140 MANUAL THERAPY 1/> REGIONS: CPT

## 2018-04-12 PROCEDURE — 97110 THERAPEUTIC EXERCISES: CPT

## 2018-04-12 NOTE — PROGRESS NOTES
Daily Note     Today's date: 2018  Patient name: Wang Cooper  : 1970  MRN: [id number]  Referring provider: Ellie Dillon MD  Dx:   Encounter Diagnosis     ICD-10-CM    1  Chronic pain of right knee M25 561     G89 29      1:1 with PTA CR 7:45-              Subjective: Patient reports no relief from injection- actually reporting increased pain rated " 10/10 "   Patient continues to see a  at the gym despite pain  Objective: See treatment diary below    Precautions: arthritis, back pain      Daily Treatment Diary     Manual  2/22 2/26 3/19 3/26 3/29 4/9 4/12   FR  [facility]     Lindsay taping          Patellar mobs  [facility]        Cyriax distracton  [facility]        tib fib mobs  [facility]        PROM  [facility]        "push" mob  [facility]        GISTM   [facility] CR CR   Prone Quad Stretch    [facility] CR CR       Exercise Diary  2/22 2/26 3/19 3/26 3/29 4/9 4/12   PPT   15 x 5"         p-ball crushers 5" x 10 15 x 5"        Clamshells  10 NP        SLR x flexion  SLR x abduction 10 ea 2 x 10 ea   1 x 10 x 3" ea     Prone bent knee hip ext 10 1 x 10        Bridges  10 NP        Monster Walks          Single Leg Stance (SLS)          SLS on foam          SLS w/ ball toss          STAR Sliders          Step Up   4"   1x 15 4"  1 x 15 4"  1 x 10  (P!)     Lat Step Ups   4"   1 x 15 4"  1 x 15 4"  1 x 15     Curve/Bike 10 5'  5'  5'  5'  8 mins 8 mins   Alter G 40 NP        Hamstring Glides 10" x 10 10 x 10"   10 x 10" 12 x 3" ea 3" x 12 ea  3"x12 ea     Quad strap stretch 10" x 10 10 x 10"   10 x 10" NP 10"x10 10"x10   IT Band Strap Stretch 10" x 10 10 x 10"   10 x 10" NP 10"x10 10"x10   Rockerboard: Balance          BIODEX          HR/TR   2x10 2 x 10 2 x 10     Slantboard    10x10" 10x10" 10 x 10"   Strap  10"x10   Wall Sits   (45 degrees)   10x10" 10x10" Re-Add NV     Squats     Add NV     Hip Add Iso     15 x 5"      Heel slides      10"x10        Modalities  2/22 2/26  3/19 3/26 4/9 4/12    laser 4' [facility] held CR    [address]   10'                     Assessment: Patient requested to continue with modified treatment due to severe pain levels  Instructed to limit activity at the gym to help decrease pain  Patient declined ice post treatment  Plan: Progress treatment as tolerated

## 2018-04-16 ENCOUNTER — OFFICE VISIT (OUTPATIENT)
Dept: OBGYN CLINIC | Facility: OTHER | Age: 48
End: 2018-04-16

## 2018-04-16 VITALS — HEART RATE: 63 BPM | DIASTOLIC BLOOD PRESSURE: 81 MMHG | SYSTOLIC BLOOD PRESSURE: 121 MMHG

## 2018-04-16 DIAGNOSIS — M25.561 CHRONIC PAIN OF RIGHT KNEE: Primary | ICD-10-CM

## 2018-04-16 DIAGNOSIS — G89.29 CHRONIC PAIN OF RIGHT KNEE: Primary | ICD-10-CM

## 2018-04-16 PROCEDURE — 20610 DRAIN/INJ JOINT/BURSA W/O US: CPT | Performed by: ORTHOPAEDIC SURGERY

## 2018-04-16 RX ORDER — HYALURONATE SODIUM 10 MG/ML
20 SYRINGE (ML) INTRAARTICULAR
Status: COMPLETED | OUTPATIENT
Start: 2018-04-16 | End: 2018-04-16

## 2018-04-16 RX ADMIN — Medication 20 MG: at 08:41

## 2018-04-16 NOTE — PROGRESS NOTES
Assessment/Plan:  Assessment/Plan   Diagnoses and all orders for this visit:    Chronic pain of right knee           Today, Ms Joanie Gilbert received her 3rd and final injection of the euflexxa series  She tolerated it well and we explained that this normal takes approximately 1 month to appreciate its effect  We explained that this can be repeated in the future in the future if there is recurrence  She should continue PT and will call for follow up in upcoming months  Subjective:          Wang Cooper is a 53 y o  female presenting today for follow up of right knee pain and to receive the final injection of euflexxa of the series  Since her last visit, she has not noticed a significant difference in her knee pain  She denies any new swelling, erythema or systemic symptoms since the injections  The following portions of the patient's history were reviewed and updated as appropriate: allergies, current medications, past family history, past medical history, past social history, past surgical history and problem list     Review of Systems   Constitutional: Negative for chills, fever and unexpected weight change  HENT: Negative for hearing loss, nosebleeds and sore throat  Eyes: Negative for pain, redness and visual disturbance  Respiratory: Negative for cough, shortness of breath and wheezing  Cardiovascular: Negative for chest pain, palpitations and leg swelling  Gastrointestinal: Negative for abdominal distention, nausea and vomiting  Endocrine: Negative for polydipsia and polyuria  Genitourinary: Negative for dysuria and hematuria  Musculoskeletal:        As noted in HPI   Skin: Negative for rash and wound  Neurological: Negative for dizziness, numbness and headaches  Psychiatric/Behavioral: Negative for decreased concentration  No depression, no anxiety       Objective:  Right Knee Exam     Muscle Strength     The patient has normal right knee strength      Tests   Varus: negative  Valgus: negative    Other   Erythema: absent  Sensation: normal  Pulse: present  Swelling: none  Other tests: no effusion present    Comments:  Palpable crepitus noted with knee extension          Observations     Right Knee   Negative for effusion  Physical Exam   Constitutional: She is oriented to person, place, and time  She appears well-developed and well-nourished  No distress  HENT:   Head: Normocephalic and atraumatic  Eyes: Conjunctivae and EOM are normal  Pupils are equal, round, and reactive to light  Right eye exhibits no discharge  Left eye exhibits no discharge  No scleral icterus  Cardiovascular: Normal rate, regular rhythm and intact distal pulses  Pulmonary/Chest: Effort normal  No respiratory distress  Musculoskeletal:        Right knee: She exhibits no effusion  Neurological: She is alert and oriented to person, place, and time  Skin: Skin is warm and dry  She is not diaphoretic  Psychiatric: She has a normal mood and affect  Her behavior is normal  Judgment and thought content normal    Vitals reviewed  I have personally reviewed pertinent films in PACS      Large joint arthrocentesis  Date/Time: 4/16/2018 8:41 AM  Consent given by: patient  Site marked: site marked  Timeout: Immediately prior to procedure a time out was called to verify the correct patient, procedure, equipment, support staff and site/side marked as required   Supporting Documentation  Indications: pain   Procedure Details  Location: knee - R knee  Preparation: Patient was prepped and draped in the usual sterile fashion  Needle size: 22 G  Ultrasound guidance: no  Approach: inferolateral   Medications administered: 20 mg Sodium Hyaluronate 20 MG/2ML    Patient tolerance: patient tolerated the procedure well with no immediate complications  Dressing:  Sterile dressing applied

## 2018-04-17 ENCOUNTER — OFFICE VISIT (OUTPATIENT)
Dept: PHYSICAL THERAPY | Facility: OTHER | Age: 48
End: 2018-04-17
Payer: COMMERCIAL

## 2018-04-17 DIAGNOSIS — G89.29 CHRONIC PAIN OF RIGHT KNEE: Primary | ICD-10-CM

## 2018-04-17 DIAGNOSIS — M25.561 CHRONIC PAIN OF RIGHT KNEE: Primary | ICD-10-CM

## 2018-04-17 PROCEDURE — 97140 MANUAL THERAPY 1/> REGIONS: CPT | Performed by: PEDIATRICS

## 2018-04-17 PROCEDURE — 97110 THERAPEUTIC EXERCISES: CPT | Performed by: PEDIATRICS

## 2018-04-17 NOTE — PROGRESS NOTES
Daily Note     Today's date: 2018  Patient name: Donna Day  : 1970  MRN: [id number]  Referring provider: Lisa Cormier MD  Dx:   Encounter Diagnosis     ICD-10-CM    1  Chronic pain of right knee M25 561     G89 29            IEP 1040 - 1050  1:1 with PTA EW 1050 - 1121       Subjective: Pt  States she received an injection in her right knee yesterday  She states she feels stiff and sore today  Objective: See treatment diary below  Precautions: arthritis, back pain      Daily Treatment Diary     Manual  2/26 3/19 3/26 3/29 4/9 4/12 4/17   FR [facility]      [place] taping          Patellar [facility]         Cyriax distracton [facility]         tib fib [facility]         PROM [facility]         "push" [facility]         GISTM  [facility] CR CR NP   Prone Quad Stretch   [facility] CR CR NP       Exercise Diary  2/26 3/19 3/26 3/29 4/9 4/12 4/17   PPT  15 x 5"          p-ball crushers 15 x 5"         Clamshells  NP         SLR x flexion  SLR x abduction 2 x 10 ea   1 x 10 x 3" ea      Prone bent knee hip ext 1 x 10         Bridges  NP         Monster Walks          Single Leg Stance (SLS)          SLS on foam          SLS w/ ball toss          STAR Sliders          Step Up  4"   1x 15 4"  1 x 15 4"  1 x 10  (P!)   6" x 15   Lat Step Ups  4"   1 x 15 4"  1 x 15 4"  1 x 15   6"  15x   Curve/Bike 5'  5'  5'  5'  8 mins 8 mins 8 min   Alter G NP         Hamstring Glides 10 x 10"   10 x 10" 12 x 3" ea 3" x 12 ea  3"x12 ea   3" x 12   Quad strap stretch 10 x 10"   10 x 10" NP 10"x10 10"x10 10" x 10   IT Band Strap Stretch 10 x 10"   10 x 10" NP 10"x10 10"x10 10" x 10   Rockerboard: Balance          BIODEX          HR/TR  2x10 2 x 10 2 x 10   30x   Slantboard   10x10" 10x10" 10 x 10"   Strap  10"x10 Slant  30" x 3   Wall Sits   (45 degrees)  10x10" 10x10" Re-Add NV   NV   Squats    Add NV   20   Hip Add Iso    15 x 5"       Heel slides     10"x10  10" x 10       Modalities  2/22 2/26  3/19 3/26 4/9 4/12 4/17   laser 4' [facility] held CR EW   [address]   10'                       Assessment: Secondary to getting injection yesterday, performed limited activity today  Performed laser, avoiding injection site  Resume all other manual techniques next visit  Tolerated treatment fair  Patient would benefit from continued PT      Plan: Continue per plan of care

## 2018-04-20 ENCOUNTER — OFFICE VISIT (OUTPATIENT)
Dept: PHYSICAL THERAPY | Facility: OTHER | Age: 48
End: 2018-04-20
Payer: COMMERCIAL

## 2018-04-20 DIAGNOSIS — M25.561 CHRONIC PAIN OF RIGHT KNEE: Primary | ICD-10-CM

## 2018-04-20 DIAGNOSIS — G89.29 CHRONIC PAIN OF RIGHT KNEE: Primary | ICD-10-CM

## 2018-04-20 PROCEDURE — G8990 OTHER PT/OT CURRENT STATUS: HCPCS

## 2018-04-20 PROCEDURE — G8991 OTHER PT/OT GOAL STATUS: HCPCS

## 2018-04-20 PROCEDURE — 97140 MANUAL THERAPY 1/> REGIONS: CPT

## 2018-04-20 PROCEDURE — 97110 THERAPEUTIC EXERCISES: CPT

## 2018-04-20 NOTE — PROGRESS NOTES
Daily Note     Today's date: 2018  Patient name: Juan Tran  : 1970  MRN: [id number]  Referring provider: Mario Wan MD  Dx:   Encounter Diagnosis     ICD-10-CM    1  Chronic pain of right knee M25 561     G89 29            1:1 with PTA CR 7:30-8:00   8:00-8:10  Subjective: Patient complaining of increased occurrence of painful clicking with walking, squats and steps  Pain 8/10  Objective: See treatment diary below  Precautions: arthritis, back pain      Daily Treatment Diary     Manual  2/26 3/19 3/26 3/29 4/9 4/12 4/17 4/20   FR [facility]   cupping        [facility]   [facility]   Laser        CR       Exercise Diary  2/26 3/19 3/26 3/29 4/9 4/12 4/17 4/20   PPT  15 x 5"           p-ball crushers 15 x 5"          Clamshells  NP          SLR x flexion  SLR x abduction 2 x 10 ea   1 x 10 x 3" ea       Prone bent knee hip ext 1 x 10          Bridges  NP          Monster Walks           Single Leg Stance (SLS)           SLS on foam           SLS w/ ball toss           STAR Sliders           Step Up  4"   1x 15 4"  1 x 15 4"  1 x 10  (P!)   6" x 15    Lat Step Ups  4"   1 x 15 4"  1 x 15 4"  1 x 15   6"  15x    Curve/Bike 5'  5'  5'  5'  8 mins 8 mins 8 min 8 mins   Alter G NP          Hamstring Glides 10 x 10"   10 x 10" 12 x 3" ea 3" x 12 ea  3"x12 ea   3" x 12 3"x12   Quad strap stretch 10 x 10"   10 x 10" NP 10"x10 10"x10 10" x 10 10"x10   IT Band Strap Stretch 10 x 10"   10 x 10" NP 10"x10 10"x10 10" x 10 10"x10   Rockerboard: Balance           BIODEX           HR/TR  2x10 2 x 10 2 x 10   30x    Slantboard   10x10" 10x10" 10 x 10"   Strap  10"x10 Slant  30" x 3 30"x3   Wall Sits   (45 degrees)  10x10" 10x10" Re-Add NV   NV    Squats    Add NV   20    Hip Add Iso    15 x 5"        Heel slides     10"x10  10" x 10        Modalities  2/22 2/26  3/19 3/26 4/9 4/12 4/17 4/20              MH post  10'       10'                  Assessment: Cupping and GR performed by PT Bobby Caceres; assess response NV   Patient in severe pain and requesting stretches and modalities only  Advised to modify activity outside of PT to prevent exacerbations  Plan: Continue per plan of care

## 2018-05-14 NOTE — PROGRESS NOTES
Patient has been discharged in [address] [place] [address] because she has been sporadic with PT attendance  She has been working out at [employer] and doing PT at the same time  PT believes she may be over doing it  PT and patient spoke on the phone  PT recommended she stop going to the gym and focus primarily on PT  At this time she was agreeable but has not come to a session since this conversation  Patient spoke of MD visit in the near future    Thank you for your referral

## 2018-06-11 ENCOUNTER — HOSPITAL ENCOUNTER (EMERGENCY)
Facility: HOSPITAL | Age: 48
Discharge: HOME | End: 2018-06-11
Attending: EMERGENCY MEDICINE | Admitting: EMERGENCY MEDICINE

## 2018-06-11 VITALS
SYSTOLIC BLOOD PRESSURE: 146 MMHG | RESPIRATION RATE: 18 BRPM | DIASTOLIC BLOOD PRESSURE: 83 MMHG | HEIGHT: 63 IN | WEIGHT: 107 LBS | TEMPERATURE: 98.3 F | BODY MASS INDEX: 18.96 KG/M2 | OXYGEN SATURATION: 97 % | HEART RATE: 55 BPM

## 2018-06-11 DIAGNOSIS — K62.5 RECTAL BLEEDING: Primary | ICD-10-CM

## 2018-06-11 LAB
BASOPHILS # BLD AUTO: 0.03 THOUSANDS/ΜL (ref 0–0.1)
BASOPHILS NFR BLD AUTO: 1 % (ref 0–1)
EOSINOPHIL # BLD AUTO: 0.09 THOUSAND/ΜL (ref 0–0.61)
EOSINOPHIL NFR BLD AUTO: 2 % (ref 0–6)
ERYTHROCYTE [DISTWIDTH] IN BLOOD BY AUTOMATED COUNT: 11.7 % (ref 11.6–15.1)
HCT VFR BLD AUTO: 42.9 % (ref 34.8–46.1)
HGB BLD-MCNC: 14.5 G/DL (ref 11.5–15.4)
IMM GRANULOCYTES # BLD AUTO: 0.01 THOUSAND/UL (ref 0–0.2)
IMM GRANULOCYTES NFR BLD AUTO: 0 % (ref 0–2)
LYMPHOCYTES # BLD AUTO: 2.13 THOUSANDS/ΜL (ref 0.6–4.47)
LYMPHOCYTES NFR BLD AUTO: 35 % (ref 14–44)
MCH RBC QN AUTO: 31.7 PG (ref 26.8–34.3)
MCHC RBC AUTO-ENTMCNC: 33.8 G/DL (ref 31.4–37.4)
MCV RBC AUTO: 94 FL (ref 82–98)
MONOCYTES # BLD AUTO: 0.53 THOUSAND/ΜL (ref 0.17–1.22)
MONOCYTES NFR BLD AUTO: 9 % (ref 4–12)
NEUTROPHILS # BLD AUTO: 3.23 THOUSANDS/ΜL (ref 1.85–7.62)
NEUTS SEG NFR BLD AUTO: 53 % (ref 43–75)
NRBC BLD AUTO-RTO: 0 /100 WBCS
PLATELET # BLD AUTO: 198 THOUSANDS/UL (ref 149–390)
PMV BLD AUTO: 10.6 FL (ref 8.9–12.7)
RBC # BLD AUTO: 4.57 MILLION/UL (ref 3.81–5.12)
WBC # BLD AUTO: 6.02 THOUSAND/UL (ref 4.31–10.16)

## 2018-06-11 PROCEDURE — 99283 EMERGENCY DEPT VISIT LOW MDM: CPT

## 2018-06-11 PROCEDURE — 85025 COMPLETE CBC W/AUTO DIFF WBC: CPT | Performed by: EMERGENCY MEDICINE

## 2018-06-11 PROCEDURE — 82272 OCCULT BLD FECES 1-3 TESTS: CPT

## 2018-06-11 PROCEDURE — 36415 COLL VENOUS BLD VENIPUNCTURE: CPT | Performed by: EMERGENCY MEDICINE

## 2018-06-11 RX ORDER — DOCUSATE SODIUM 100 MG/1
100 CAPSULE, LIQUID FILLED ORAL EVERY 12 HOURS
Qty: 60 CAPSULE | Refills: 0 | Status: SHIPPED | OUTPATIENT
Start: 2018-06-11 | End: 2020-09-27 | Stop reason: ALTCHOICE

## 2018-06-11 NOTE — ED ATTENDING ATTESTATION
Stewart Hernandez DO, saw and evaluated the patient  I have discussed the patient with the resident/non-physician practitioner and agree with the resident's/non-physician practitioner's findings, Plan of Care, and MDM as documented in the resident's/non-physician practitioner's note, except where noted  All available labs and Radiology studies were reviewed  At this point I agree with the current assessment done in the Emergency Department  I have conducted an independent evaluation of this patient a history and physical is as follows:      54 y/o female with one episode of rectal bleeding  Pt noted small amount of blood clot while wiping after normal stool this am and noted small amount of blood in toilet  No pain associated with bowel movement  Denies straining during BM  No prev episodes of rectal bleeding  No h/o malignancy  No n/v/d/c, no dysuria, hematuria  No recent travel or abx usage  No cp, sob  h/o appy, matthieu,, hysterectomy  Rectal exam: no anal fissure, no ext hemorrhoids, heme negative stool  Normal CBC  F/u PCP for o/p colonoscopy  Return if worsens       Critical Care Time  CritCare Time    Procedures

## 2018-06-11 NOTE — ED PROVIDER NOTES
History  Chief Complaint   Patient presents with    Rectal Bleeding - Minor     Noted blood clot on stool this morning   "large"     HPI  52 yo female presenting to ER for evaluation of rectal bleeding  Patient had BM this morning, normal caliber, and found a blood clot on wiping, a small amount of blood in toilet, stool did not have blood on it  Stool was brown in color  Patient denies anal pain or pain with defecation, but did have cramping her abdomen  For past 2 weeks has been feeling nausea after she eats  No prior history of rectal bleeding, but does have hx of hemorrhods in the past   No vomiting  No diarrhea, no recent hospitalization or travel, no fevers  No hematuria, no dysuria  Patient states she felt a little lightheaded today, no chest pain, no SOB  PMH: appendectomy, hysterectomy, cholecystectomy      Prior to Admission Medications   Prescriptions Last Dose Informant Patient Reported? Taking?   acetaminophen (TYLENOL) 500 mg tablet   Yes No   Sig: Take by mouth   meloxicam (MOBIC) 7 5 mg tablet   No No   Sig: Take 1 tablet (7 5 mg total) by mouth daily   omeprazole (PriLOSEC) 40 MG capsule   No No   Sig: Take 1 capsule (40 mg total) by mouth daily for 14 days   ondansetron (ZOFRAN) 8 mg tablet   Yes No   Sig: Take by mouth      Facility-Administered Medications: None       Past Medical History:   Diagnosis Date    Helicobacter pylori (H  pylori) infection        Past Surgical History:   Procedure Laterality Date    ABDOMINAL ADHESION SURGERY      lysis of peritoneal adhesions    APPENDECTOMY      CHOLECYSTECTOMY      HYSTERECTOMY      OVARY SURGERY      SALPINGECTOMY      SALPINGOOPHORECTOMY Left        Family History   Problem Relation Age of Onset    Ovarian cancer Paternal Grandmother     Arthritis Other     Cancer Other     Osteoporosis Other      I have reviewed and agree with the history as documented      Social History   Substance Use Topics    Smoking status: Never Smoker    Smokeless tobacco: Never Used    Alcohol use Yes      Comment: social        Review of Systems    Constitutional: Negative for appetite change, chills and fever  HENT: Negative for congestion, rhinorrhea and sore throat  Eyes: Negative for photophobia, pain and visual disturbance  Respiratory: Negative for cough, chest tightness and shortness of breath  Cardiovascular: Negative for chest pain, palpitations and leg swelling  Gastrointestinal: Negative for abdominal pain, diarrhea, nausea and vomiting  Genitourinary: Negative for dysuria, flank pain and hematuria  Musculoskeletal: Negative for back pain, neck pain and neck stiffness  Skin: Negative for color change, rash and wound  Neurological: Negative for dizziness, numbness and headaches  All other systems reviewed and are negative      Physical Exam  ED Triage Vitals   Temperature Pulse Respirations Blood Pressure SpO2   06/11/18 1018 06/11/18 1018 06/11/18 1018 06/11/18 1018 06/11/18 1018   98 3 °F (36 8 °C) 62 16 (!) 174/99 98 %      Temp src Heart Rate Source Patient Position - Orthostatic VS BP Location FiO2 (%)   -- 06/11/18 1159 06/11/18 1159 06/11/18 1159 --    Monitor Lying Right arm       Pain Score       06/11/18 1018       8           Orthostatic Vital Signs  Vitals:    06/11/18 1018 06/11/18 1159   BP: (!) 174/99 146/83   Pulse: 62 55   Patient Position - Orthostatic VS:  Lying       Physical Exam  /83 (BP Location: Right arm)   Pulse 55   Temp 98 3 °F (36 8 °C)   Resp 18   Ht 5' 3" (1 6 m)   Wt 48 5 kg (107 lb)   SpO2 97%   BMI 18 95 kg/m²     General Appearance:  Alert, cooperative, no distress   Head:  Normocephalic, without obvious abnormality, atraumatic   Eyes:  PERRL, conjunctiva/corneas clear, EOM's intact       Nose: Nares normal, septum midline,mucosa normal, no drainage or sinus tenderness   Throat: Lips, mucosa, and tongue normal; teeth and gums normal   Neck: Supple, symmetrical, trachea midline, no adenopathy   Back:   Symmetric, no curvature, ROM normal, no CVA tenderness   Lungs:   Clear to auscultation bilaterally, respirations unlabored   Heart:  Regular rate and rhythm, S1 and S2 normal, no murmur, rub, or gallop   Abdomen:   Soft, non-tender, bowel sounds active all four quadrants  Rectal exam done with nurse: no fissure or external hemorrhoids, no masses palpated on exam, heme occult was negative   Pelvic: Deferred   Extremities: Extremities normal, atraumatic, no cyanosis or edema   Pulses: 2+ and symmetric   Skin: Skin color, texture, turgor normal, no rashes or lesions   Neurologic:      Psychiatric: Moves all extremities, sensation and strength in tact in all extremities    Normal mood and affect       ED Medications  Medications - No data to display    Diagnostic Studies  Results Reviewed     Procedure Component Value Units Date/Time    CBC and differential [[id number]] Collected:  06/11/18 1127    Lab Status:  Final result Specimen:  Blood from Arm, Left Updated:  06/11/18 1136     WBC 6 02 Thousand/uL      RBC 4 57 Million/uL      Hemoglobin 14 5 g/dL      Hematocrit 42 9 %      MCV 94 fL      MCH 31 7 pg      MCHC 33 8 g/dL      RDW 11 7 %      MPV 10 6 fL      Platelets 065 Thousands/uL      nRBC 0 /100 WBCs      Neutrophils Relative 53 %      Immat GRANS % 0 %      Lymphocytes Relative 35 %      Monocytes Relative 9 %      Eosinophils Relative 2 %      Basophils Relative 1 %      Neutrophils Absolute 3 23 Thousands/µL      Immature Grans Absolute 0 01 Thousand/uL      Lymphocytes Absolute 2 13 Thousands/µL      Monocytes Absolute 0 53 Thousand/µL      Eosinophils Absolute 0 09 Thousand/µL      Basophils Absolute 0 03 Thousands/µL                  No orders to display         Procedures  Procedures      Phone Consults  ED Phone Contact    ED Course  ED Course as of Jun 11 1346 Mon Jun 11, 2018   1221 HGB is normal, will give f/u to GI        MDM   Patient with possible internal hemorrhoids, will check cbc for anemia  Treat with stool softeners and follow up with GI doctor  CritCare Time    Disposition  Final diagnoses:   Rectal bleeding     Time reflects when diagnosis was documented in both MDM as applicable and the Disposition within this note     Time User Action Codes Description Comment    6/11/2018 12:21 PM Maik Richardson, [facility] [K62 5] Rectal bleeding       ED Disposition     ED Disposition Condition Comment    Discharge  [facility] discharge to home/self care  Condition at discharge: Stable        Follow-up Information     Follow up With Specialties Details Why Contact Info    Sandy Nation,  General Practice Go in 2 days  [address] [facility] Call today for appointment [facility]  [phone]          Discharge Medication List as of 6/11/2018 12:23 PM      START taking these medications    Details   docusate sodium (COLACE) 100 mg capsule Take 1 capsule (100 mg total) by mouth every 12 (twelve) hours, Starting Mon 6/11/2018, Print         CONTINUE these medications which have NOT CHANGED    Details   acetaminophen (TYLENOL) 500 mg tablet Take by mouth, Starting Thu 1/18/2018, Historical Med      meloxicam (MOBIC) 7 5 mg tablet Take 1 tablet (7 5 mg total) by mouth daily, Starting Mon 3/12/2018, Normal      omeprazole (PriLOSEC) 40 MG capsule Take 1 capsule (40 mg total) by mouth daily for 14 days, Starting Fri 2/9/2018, Until Fri 2/23/2018, Normal      ondansetron (ZOFRAN) 8 mg tablet Take by mouth, Starting Thu 1/18/2018, Historical Med           No discharge procedures on file  ED Provider  Attending physically available and evaluated [facility]  I managed the patient along with the ED Attending      Electronically Signed by         Brisa Toussaint MD  06/11/18 1276

## 2018-06-11 NOTE — DISCHARGE INSTRUCTIONS
Please take stool softeners on a daily basis until you follow up with your doctor or GI doctor  Rectal Bleeding   WHAT YOU NEED TO KNOW:   What can cause rectal bleeding? · Constipation    · Hemorrhoids (swollen blood vessels in your rectum)    · Anal fissures (tears in the tissue inside your anus)    · Medical conditions, such as cancer, colitis, or diverticulitis     · Growths, such as tumors or polyps    · Medical treatments, such as radiation or rectal surgery  What increases my risk for rectal bleeding? · Older age    · Certain medicines, such as blood thinners and NSAIDs    · Medical conditions, such as inflammatory bowel disease, liver disease, or HIV  What other signs and symptoms may happen with rectal bleeding? You may have pain in your rectum or anus  You may also have abdominal pain or cramping  How is the cause of rectal bleeding diagnosed? · Rectal exam:  Your healthcare provider may gently insert a gloved finger into your anus  He will collect a bowel movement sample and send it to a lab for tests  · Blood tests: You may need blood taken to check for anemia (low amount of red blood cells)  · CT scan: This test is also called a CAT scan  An x-ray machine uses a computer to take pictures of the organs and blood vessels in your abdomen  The pictures may show problems that could cause bleeding  You may be given a dye before the pictures are taken to help healthcare providers see the pictures better  Tell the healthcare provider if you have ever had an allergic reaction to contrast dye  · Colonoscopy: This is a procedure to look inside your lower bowel  It may show where the bleeding is coming from and what is causing it  A tube with a light on the end will be put into your anus and then moved into your colon  If your healthcare provider finds a growth, he may remove it  · Endoscopy: This is a procedure to look inside your upper bowel   It may show where the bleeding is coming from and what is causing it  A tube with a light on the end is inserted into your throat and moved down into your stomach and upper bowel  If your healthcare provider finds a growth, he may remove it  He may put a shot of medicine in bleeding areas to narrow the blood vessels and stop the bleeding  Heat, laser, or electric currents may also be used to make the blood clot  How is rectal bleeding treated? · Medicine:      ¨ Pain medicine: You may be given a prescription medicine to decrease pain  Do not wait until the pain is severe before you take this medicine  ¨ Vasoconstrictors: This medicine decreases the size of your blood vessels and may help stop the bleeding  ¨ Iron supplement:  Iron helps your body make more red blood cells  ¨ Steroids: This medicine decreases inflammation in your rectum  It may be applied as a cream, ointment, or lotion  · IV:  You may need an IV if you are dehydrated and need extra liquids  · Blood transfusion:  You will get whole or parts of blood through an IV during a transfusion  Blood is tested for diseases, such as hepatitis and HIV, to be sure it is safe  · Surgery: You may need surgery to remove hemorrhoids, tumors, or polyps  What are the risks of rectal bleeding? · You may have abdominal pain or damage to nearby organs and blood vessels with surgery  Even with treatment, rectal bleeding may continue  Or, it may go away for a time and start again  · Without treatment, you may continue to have pain and cramping  You may develop anemia  You may need a blood transfusion  You may lose a large amount of blood  This can be life-threatening  How can I manage my symptoms? Ask your healthcare provider how much liquid to drink each day and which liquids are best for you  This will help prevent dehydration and constipation  When should I contact my healthcare provider? · You have a fever      · Your rectal bleeding stopped for a time, but has started again      · You have nausea  · You have cold, sweaty, pale skin  · You have changes in your bowel movements, such as diarrhea  · You have questions or concerns about your condition or care  When should I seek immediate care or call 911? · You are breathing faster than usual     · You are dizzy, lightheaded, or feel faint  · You are confused or cannot think clearly  · You urinate less than usual or not at all  · Your rectal bleeding is constant or heavy  · You have severe abdominal pain or cramping  CARE AGREEMENT:   You have the right to help plan your care  Learn about your health condition and how it may be treated  Discuss treatment options with your caregivers to decide what care you want to receive  You always have the right to refuse treatment  The above information is an  only  It is not intended as medical advice for individual conditions or treatments  Talk to your doctor, nurse or pharmacist before following any medical regimen to see if it is safe and effective for you  © 2017 [employer] Information is for End User's use only and may not be sold, redistributed or otherwise used for commercial purposes  All illustrations and images included in CareNotes® are the copyrighted property of A D [employer] M [employer]  or [employer]

## 2020-02-09 ENCOUNTER — OFFICE VISIT (OUTPATIENT)
Dept: URGENT CARE | Age: 50
End: 2020-02-09
Payer: COMMERCIAL

## 2020-02-09 ENCOUNTER — APPOINTMENT (OUTPATIENT)
Dept: RADIOLOGY | Age: 50
End: 2020-02-09
Payer: COMMERCIAL

## 2020-02-09 VITALS
OXYGEN SATURATION: 98 % | RESPIRATION RATE: 18 BRPM | BODY MASS INDEX: 26.93 KG/M2 | DIASTOLIC BLOOD PRESSURE: 82 MMHG | SYSTOLIC BLOOD PRESSURE: 144 MMHG | HEART RATE: 72 BPM | TEMPERATURE: 98.6 F | HEIGHT: 63 IN | WEIGHT: 152 LBS

## 2020-02-09 DIAGNOSIS — S92.901A CLOSED FRACTURE OF RIGHT FOOT, INITIAL ENCOUNTER: Primary | ICD-10-CM

## 2020-02-09 DIAGNOSIS — S99.911A ANKLE INJURY, RIGHT, INITIAL ENCOUNTER: ICD-10-CM

## 2020-02-09 PROCEDURE — G0382 LEV 3 HOSP TYPE B ED VISIT: HCPCS | Performed by: PHYSICIAN ASSISTANT

## 2020-02-09 PROCEDURE — 73610 X-RAY EXAM OF ANKLE: CPT

## 2020-02-09 PROCEDURE — 29125 APPL SHORT ARM SPLINT STATIC: CPT | Performed by: PHYSICIAN ASSISTANT

## 2020-02-09 PROCEDURE — 73630 X-RAY EXAM OF FOOT: CPT

## 2020-02-09 NOTE — PATIENT INSTRUCTIONS
RICE- rest, ice, compression, elevation  Keep in splint until seen by Ortho  Crutches/nonweightbearing  Call Ortho today and follow-up with them in the next 1-2 days for further evaluation and treatment     Call Kanchan Shaver to schedule an appointment: [phone]  Or the direct Ortho number at [phone] to schedule an appointment   Go to the ER immediately if any numbness, tingling, weakness, change in intensity or location of pain, calf pain or swelling, other new or concerning symptoms

## 2020-02-09 NOTE — PROGRESS NOTES
[facility]        NAME: Mana Myers is a 53 y o  female  : 1970    MRN: [id number]  DATE: 2020  TIME: 12:32 PM    Assessment and Plan   Closed fracture of right foot, initial encounter [[id number] [id number]]  1  Closed fracture of right foot, initial encounter  Ambulatory referral to Orthopedic Surgery   2  Ankle injury, right, initial encounter  XR ankle 3+ vw right    XR foot 3+ vw right     RIGHT FOOT     INDICATION:   [id number]: Unspecified injury of right ankle, initial encounter      COMPARISON:  None     VIEWS:  XR FOOT 3+ VW RIGHT   Images: 3     FINDINGS:     There is a focal area of cortical disruption involving the medial aspect of the tarsal navicula  This would raise the suspicion of a nondisplaced fracture  This is confirmed on 2 views  Distraction is submillimeter  Alignment is anatomic      No significant degenerative changes      No lytic or blastic lesions seen      Soft tissues are unremarkable      IMPRESSION:     Nondisplaced fracture of tarsal navicula  RIGHT ANKLE     INDICATION:   [id number]: Unspecified injury of right ankle, initial encounter      COMPARISON:  None     VIEWS:  XR ANKLE 3+ VW RIGHT   Images: 3     FINDINGS:     Injury to the navicula is again noted  No other fractures are seen     No significant degenerative changes      No lytic or blastic lesions seen      Soft tissues are unremarkable      IMPRESSION:     Nondisplaced fracture of tarsal navicula    Splint and crutches- placed by medical staff for comfort, NV intact post-splinting    Patient Instructions     RICE- rest, ice, compression, elevation  Keep in splint until seen by Ortho  Crutches/nonweightbearing  Call Ortho today and follow-up with them in the next 1-2 days for further evaluation and treatment     Call [facility] Shaver to schedule an appointment: [phone]  Or the direct Ortho number at [phone] to schedule an appointment   Go to the ER immediately if any numbness, tingling, weakness, change in intensity or location of pain, calf pain or swelling, other new or concerning symptoms    Chief Complaint     Chief Complaint   Patient presents with    Ankle Injury     Last night she fell down 2 steps and injured her right ankle and foot - she is not able to bear weight at this time         History of Present Illness       81-year-old female presents with right foot and ankle pain after injury that occurred last night  States she had a mechanical fall where she slipped missing the last 2 steps and twisted her foot and ankle wrong when she landed  States he did not hit her head or lose consciousness  Pain is worse with attempting to walk or with palpation, pain resolved at rest   She has not tried any ice or any other Tylenol or Motrin for the pain  She had some swelling to the lateral aspect of the ankle this morning  Denies any calf pain or swelling  Denies any numbness, tingling, weakness or other complaints  No abrasions or lacerations  Denies any pregnancy risk      Review of Systems   Review of Systems   Constitutional: Negative for chills, fatigue and fever  Respiratory: Negative for cough and shortness of breath  Cardiovascular: Negative for chest pain and leg swelling  Musculoskeletal: Positive for arthralgias and joint swelling  Negative for back pain and neck pain  Skin: Negative for rash and wound  Neurological: Negative for dizziness, weakness, numbness and headaches  All other systems reviewed and are negative          Current Medications       Current Outpatient Medications:     acetaminophen (TYLENOL) 500 mg tablet, Take by mouth, Disp: , Rfl:     docusate sodium (COLACE) 100 mg capsule, Take 1 capsule (100 mg total) by mouth every 12 (twelve) hours, Disp: 60 capsule, Rfl: 0    meloxicam (MOBIC) 7 5 mg tablet, Take 1 tablet (7 5 mg total) by mouth daily, Disp: 14 tablet, Rfl: 0    omeprazole (PriLOSEC) 40 MG capsule, Take 1 capsule (40 mg total) by mouth daily for 14 days, Disp: 14 capsule, Rfl: 1    ondansetron (ZOFRAN) 8 mg tablet, Take by mouth, Disp: , Rfl:     Current Allergies     Allergies as of 02/09/2020 - Reviewed 02/09/2020   Allergen Reaction Noted    Codeine  09/16/2016    Morphine  08/22/2017            The following portions of the patient's history were reviewed and updated as appropriate: allergies, current medications, past family history, past medical history, past social history, past surgical history and problem list      Past Medical History:   Diagnosis Date    Helicobacter pylori (H  pylori) infection        Past Surgical History:   Procedure Laterality Date    ABDOMINAL ADHESION SURGERY      lysis of peritoneal adhesions    APPENDECTOMY      CHOLECYSTECTOMY      HYSTERECTOMY      OVARY SURGERY      SALPINGECTOMY      SALPINGOOPHORECTOMY Left        Family History   Problem Relation Age of Onset    Ovarian cancer Paternal Grandmother     Arthritis Other     Cancer Other     Osteoporosis Other          Medications have been verified  Objective   /82 (BP Location: Right arm, Patient Position: Sitting, Cuff Size: Standard)   Pulse 72   Temp 98 6 °F (37 °C) (Temporal)   Resp 18   Ht 5' 3" (1 6 m)   Wt 68 9 kg (152 lb)   SpO2 98%   BMI 26 93 kg/m²        Physical Exam     Physical Exam   Constitutional: She is oriented to person, place, and time  She appears well-developed and well-nourished  No distress  Cardiovascular: Normal rate, regular rhythm and normal heart sounds  Pulmonary/Chest: Effort normal and breath sounds normal    Musculoskeletal:        Right ankle: She exhibits decreased range of motion (In all directions due to pain) and swelling  She exhibits no ecchymosis, no deformity, no laceration and normal pulse  Tenderness (Mild, reproducible tenderness to palpation throughout the lateral ligamental region and the dorsum of the foot  Mild swelling to the lateral ligamental region)          Right foot: There is tenderness  There is normal range of motion, no swelling, normal capillary refill, no deformity and no laceration  No tib fib or calf swelling or tenderness to palpation  Dorsiflexion/plantar flexion intact  Able to wiggle toes  No erythema, warmth, abrasions loss lacerations or other abnormalities or deformities  Neurological: She is alert and oriented to person, place, and time  She has normal reflexes  No sensory deficit  NV intact with sensation and strong peripheral pulses  5/5 strength of LE bilaterally   Skin: Skin is warm and dry  Capillary refill takes less than 2 seconds  Psychiatric: She has a normal mood and affect  Her behavior is normal    Nursing note and vitals reviewed  Splint application  Date/Time: 2/9/2020 12:36 PM  Performed by: Keisha Jung PA-C  Authorized by: Keisha Jung PA-C     Consent:     Consent obtained:  Verbal    Consent given by:  Patient    Risks discussed:  Discoloration, numbness, pain and swelling    Alternatives discussed:  No treatment, delayed treatment, alternative treatment, observation and referral  Pre-procedure details:     Sensation:  Normal  Procedure details:     Laterality:  Right    Location:  Ankle    Splint type:  Short leg (Static posterior and sugar-tong)    Supplies:  Cotton padding, Ortho-Glass, elastic bandage and skin protective strip  Post-procedure details:     Pain:  Improved    Sensation:  Normal    Patient tolerance of procedure:   Tolerated well, no immediate complications  Comments:      Neurovascularly intact post splinting

## 2020-02-10 ENCOUNTER — OFFICE VISIT (OUTPATIENT)
Dept: OBGYN CLINIC | Facility: CLINIC | Age: 50
End: 2020-02-10
Payer: OTHER MISCELLANEOUS

## 2020-02-10 VITALS
HEART RATE: 69 BPM | SYSTOLIC BLOOD PRESSURE: 153 MMHG | HEIGHT: 63 IN | BODY MASS INDEX: 26.93 KG/M2 | DIASTOLIC BLOOD PRESSURE: 97 MMHG | WEIGHT: 152 LBS

## 2020-02-10 DIAGNOSIS — S92.254A CLOSED NONDISPLACED FRACTURE OF NAVICULAR BONE OF RIGHT FOOT, INITIAL ENCOUNTER: Primary | ICD-10-CM

## 2020-02-10 PROCEDURE — 99203 OFFICE O/P NEW LOW 30 MIN: CPT | Performed by: PHYSICAL MEDICINE & REHABILITATION

## 2020-02-10 PROCEDURE — 28450 TX TARSAL B1 FX W/O MNPJ EA: CPT | Performed by: PHYSICAL MEDICINE & REHABILITATION

## 2020-02-10 NOTE — LETTER
To Whom It May Concern,    Cristi Douglass is under my professional care  She was seen in my office on February 10, 2020  She is out of work until cleared by a physician  If you have any questions or concerns, please don't hesitate to call          Sincerely,          Erasmo Solis, DO

## 2020-02-10 NOTE — PROGRESS NOTES
1  Closed nondisplaced fracture of navicular bone of right foot, initial encounter       Orders Placed This Encounter   Procedures    Fracture / Dislocation Treatment        Imaging Studies (I personally reviewed images in PACS and report):  Right foot and ankle x-rays dated 2/9/2020  These images show cortical disruption of the navicular tuberosity without displacement  No other abnormalities noted  Impression:  Right foot pain secondary to nondisplaced navicular tuberosity fracture with date of injury as 2/8/2020  Since she is still very swollen as her injury just occurred, we decided to place her back in the splint  She will return towards the end of the week after she has elevated and iced as much as she can so that we may place her into a definitive cast in the form of a short-leg walking cast   If her pain worsens any, I would want to see her earlier  Otherwise, we will see her this upcoming Friday  No follow-ups on file  HPI:  Brittany Singletary is a 53 y o  female  who presents for evaluation of   Chief Complaint   Patient presents with    Right Foot - Pain       Onset/Mechanism:  2/8/2020-the patient fell down two steps and inverted her ankle  Location: Throughout the entire foot  Radiation: Denies  Quality: Stabbing  Provocative: Moving the foot and weightbearing  Severity: Severe  Associated Symptoms: Swelling and pain  Treatment so far: Seen at ED and given splint and crutches  Review of Systems   Constitutional: Positive for activity change  Negative for fever  HENT: Negative for sore throat  Eyes: Negative for visual disturbance  Respiratory: Negative for shortness of breath  Cardiovascular: Negative for chest pain  Gastrointestinal: Negative for abdominal pain  Endocrine: Negative for polydipsia  Genitourinary: Negative for difficulty urinating  Musculoskeletal: Positive for arthralgias  Skin: Negative for rash     Allergic/Immunologic: Negative for immunocompromised state  Neurological: Negative for numbness  Hematological: Does not bruise/bleed easily  Psychiatric/Behavioral: Negative for confusion  Following history reviewed and updated:  Past Medical History:   Diagnosis Date    Helicobacter pylori (H  pylori) infection      Past Surgical History:   Procedure Laterality Date    ABDOMINAL ADHESION SURGERY      lysis of peritoneal adhesions    APPENDECTOMY      CHOLECYSTECTOMY      HYSTERECTOMY      OVARY SURGERY      SALPINGECTOMY      SALPINGOOPHORECTOMY Left      Social History   Social History     Substance and Sexual Activity   Alcohol Use Yes    Comment: social     Social History     Substance and Sexual Activity   Drug Use No     Social History     Tobacco Use   Smoking Status Never Smoker   Smokeless Tobacco Never Used     Family History   Problem Relation Age of Onset    Ovarian cancer Paternal Grandmother     Arthritis Other     Cancer Other     Osteoporosis Other      Allergies   Allergen Reactions    Codeine     Morphine         Constitutional:  /97 (BP Location: Right arm, Patient Position: Sitting, Cuff Size: Standard)   Pulse 69   Ht 5' 3" (1 6 m)   Wt 68 9 kg (152 lb)   BMI 26 93 kg/m²    General: NAD  Eyes: Anicteric sclerae  Neck: Supple  Lungs: Unlabored breathing  Cardiovascular: No lower extremity edema  Skin: Intact without erythema  Neurologic: Sensation intact to light touch  Psychiatric: Mood and affect are appropriate  Right Ankle Exam     Tenderness   The patient is experiencing tenderness in the ATF and medial malleolus (Tender along the navicular tuberosity  She is diffusely tender throughout the forefoot)    Swelling: severe    Range of Motion   Dorsiflexion: abnormal   Plantar flexion: abnormal   Eversion: abnormal   Inversion: abnormal     Other   Erythema: absent  Scars: absent  Sensation: normal  Pulse: present              Fracture / Dislocation Treatment  Date/Time: 2/10/2020 10:39 AM  Performed by: Rajesh Menendez DO  Authorized by: Rajesh Menendez DO     Patient Location:  Clinic  Other Assisting Provider: Yes (comment)    Verbal consent obtained?: Yes    Risks and benefits: Risks, benefits and alternatives were discussed    Consent given by:  Patient  Patient states understanding of procedure being performed: Yes    Relevant documents present and verified: Yes    Radiology Images displayed and confirmed  If images not available, report reviewed: Yes    Patient identity confirmed:  Verbally with patient  Injury location:  Foot  Location details:  Right foot  Injury type:  Fracture  Fracture type: navicular    Neurovascular status: Neurovascularly intact    Distal perfusion: normal    Neurological function: normal    Range of motion: reduced    Local anesthesia used?: No    Splint type:  Short leg  Neurovascular status: Neurovascularly intact    Distal perfusion: normal    Neurological function: normal    Range of motion: unchanged    Patient tolerance:  Patient tolerated the procedure well with no immediate complications     - she will most likely be placed into a short-leg walking cast later this week  This document was recorded using voice recognition software and errors may be noted

## 2020-02-13 NOTE — PROGRESS NOTES
1  Closed nondisplaced fracture of navicular bone of right foot with routine healing, subsequent encounter       No orders of the defined types were placed in this encounter  Imaging Studies (I personally reviewed images in PACS and report):  Right foot and ankle x-rays dated 2/9/2020  These images show cortical disruption of the navicular tuberosity without displacement  No other abnormalities noted      Impression:  Patient is here in follow-up of right foot pain secondary to nondisplaced navicular tuberosity fracture with date of injury as 2/8/2020  Her swelling has improved since her last visit  We decided to place her into a short-leg walking cast   I will see her back in 2 weeks to reassess  We will remove her cast and get weight-bearing x-rays of her foot as her most recent ones were not weight-bearing  This will be to ensure that the are no other midfoot injuries  We will also check her vitamin-D levels  Return in about 2 weeks (around 2/28/2020)  HPI:  Carlos Vaughn is a 53 y o  female  who presents in follow up  Here for   Chief Complaint   Patient presents with    Follow-up       Date of injury: 2/8/2020-the patient fell down two steps and inverted her ankle  Trajectory of symptoms:  Swelling slightly improved since she saw me last     Review of Systems   Constitutional: Positive for activity change  Negative for fever  HENT: Negative for sore throat  Eyes: Negative for visual disturbance  Respiratory: Negative for shortness of breath  Cardiovascular: Negative for chest pain  Gastrointestinal: Negative for abdominal pain  Endocrine: Negative for polydipsia  Genitourinary: Negative for difficulty urinating  Musculoskeletal: Positive for arthralgias  Skin: Negative for rash  Allergic/Immunologic: Negative for immunocompromised state  Neurological: Negative for numbness  Hematological: Does not bruise/bleed easily     Psychiatric/Behavioral: Negative for confusion  Following history reviewed and updated:  Past Medical History:   Diagnosis Date    Helicobacter pylori (H  pylori) infection      Past Surgical History:   Procedure Laterality Date    ABDOMINAL ADHESION SURGERY      lysis of peritoneal adhesions    APPENDECTOMY      CHOLECYSTECTOMY      HYSTERECTOMY      OVARY SURGERY      SALPINGECTOMY      SALPINGOOPHORECTOMY Left      Social History   Social History     Substance and Sexual Activity   Alcohol Use Yes    Comment: social     Social History     Substance and Sexual Activity   Drug Use No     Social History     Tobacco Use   Smoking Status Never Smoker   Smokeless Tobacco Never Used     Family History   Problem Relation Age of Onset    Ovarian cancer Paternal Grandmother     Arthritis Other     Cancer Other     Osteoporosis Other      Allergies   Allergen Reactions    Codeine     Morphine         Constitutional:  /74 (BP Location: Right arm, Patient Position: Sitting, Cuff Size: Standard)   Pulse 69   Ht 5' 3" (1 6 m)   Wt 68 9 kg (152 lb)   BMI 26 93 kg/m²    General: NAD  Eyes: Clear sclerae  ENT: No inflammation, lesion, or mass of lips  No tracheal deviation  Musculoskeletal: As mentioned below  Integumentary: No visible rashes or skin lesions  Pulmonary/Chest: Effort normal  No respiratory distress  Neuro: CN's grossly intact, ASH  Psych: Normal affect and judgement  Vascular: WWP  Right Ankle Exam     Tenderness   The patient is experiencing tenderness in the ATF (Tender along the navicular tuberosity)  Swelling: mild    Range of Motion   Dorsiflexion: abnormal   Plantar flexion: abnormal   Eversion: abnormal   Inversion: abnormal     Other   Erythema: absent  Scars: absent  Sensation: normal  Pulse: present              Procedures we placed her into a short-leg walking cast today

## 2020-02-14 ENCOUNTER — APPOINTMENT (OUTPATIENT)
Dept: LAB | Facility: AMBULARY SURGERY CENTER | Age: 50
End: 2020-02-14
Payer: OTHER MISCELLANEOUS

## 2020-02-14 ENCOUNTER — OFFICE VISIT (OUTPATIENT)
Dept: OBGYN CLINIC | Facility: CLINIC | Age: 50
End: 2020-02-14

## 2020-02-14 ENCOUNTER — TRANSCRIBE ORDERS (OUTPATIENT)
Dept: LAB | Facility: AMBULARY SURGERY CENTER | Age: 50
End: 2020-02-14

## 2020-02-14 VITALS
SYSTOLIC BLOOD PRESSURE: 118 MMHG | HEIGHT: 63 IN | HEART RATE: 69 BPM | WEIGHT: 152 LBS | DIASTOLIC BLOOD PRESSURE: 74 MMHG | BODY MASS INDEX: 26.93 KG/M2

## 2020-02-14 DIAGNOSIS — E55.9 HYPOVITAMINOSIS D: ICD-10-CM

## 2020-02-14 DIAGNOSIS — S92.254D CLOSED NONDISPLACED FRACTURE OF NAVICULAR BONE OF RIGHT FOOT WITH ROUTINE HEALING, SUBSEQUENT ENCOUNTER: Primary | ICD-10-CM

## 2020-02-14 DIAGNOSIS — S92.254D CLOSED NONDISPLACED FRACTURE OF NAVICULAR BONE OF RIGHT FOOT WITH ROUTINE HEALING, SUBSEQUENT ENCOUNTER: ICD-10-CM

## 2020-02-14 LAB — 25(OH)D3 SERPL-MCNC: 24.2 NG/ML (ref 30–100)

## 2020-02-14 PROCEDURE — 36415 COLL VENOUS BLD VENIPUNCTURE: CPT

## 2020-02-14 PROCEDURE — 82306 VITAMIN D 25 HYDROXY: CPT

## 2020-02-14 PROCEDURE — 99024 POSTOP FOLLOW-UP VISIT: CPT | Performed by: PHYSICAL MEDICINE & REHABILITATION

## 2020-02-14 NOTE — PATIENT INSTRUCTIONS
You had a cast applied today  It is a good idea to try to elevate the region of the fracture as much as possible for the next few days, even above the level of your heart if possible  If you have significant pain from the cast, or if you have extremity numbness, coldness, or pain from slight toe movements, you should be seen urgently for evaluation, as this may indicate that the cast is too tight  If this happens when the orthopedics office is closed, you should be evaluated right away at an urgent or emergent care center   If this happens, it is usually from continued swelling after the cast has been applied, which is why elevation of the extremity to reduce swelling is beneficial

## 2020-02-20 ENCOUNTER — HOSPITAL ENCOUNTER (EMERGENCY)
Facility: HOSPITAL | Age: 50
Discharge: HOME | End: 2020-02-21
Attending: EMERGENCY MEDICINE | Admitting: EMERGENCY MEDICINE
Payer: OTHER MISCELLANEOUS

## 2020-02-20 DIAGNOSIS — M25.571 ANKLE PAIN, RIGHT: ICD-10-CM

## 2020-02-20 DIAGNOSIS — Z46.89 ENCOUNTER FOR CAST CHECK: Primary | ICD-10-CM

## 2020-02-20 DIAGNOSIS — R20.0 NUMBNESS OF TOES: ICD-10-CM

## 2020-02-20 PROCEDURE — 99284 EMERGENCY DEPT VISIT MOD MDM: CPT

## 2020-02-20 PROCEDURE — 99285 EMERGENCY DEPT VISIT HI MDM: CPT | Performed by: EMERGENCY MEDICINE

## 2020-02-20 RX ORDER — OXYCODONE HYDROCHLORIDE 10 MG/1
10 TABLET ORAL ONCE
Status: COMPLETED | OUTPATIENT
Start: 2020-02-20 | End: 2020-02-20

## 2020-02-20 RX ORDER — CYCLOBENZAPRINE HCL 10 MG
10 TABLET ORAL ONCE
Status: COMPLETED | OUTPATIENT
Start: 2020-02-21 | End: 2020-02-21

## 2020-02-20 RX ORDER — ACETAMINOPHEN 325 MG/1
975 TABLET ORAL ONCE
Status: COMPLETED | OUTPATIENT
Start: 2020-02-20 | End: 2020-02-20

## 2020-02-20 RX ADMIN — ACETAMINOPHEN 975 MG: 325 TABLET ORAL at 22:23

## 2020-02-20 RX ADMIN — OXYCODONE HYDROCHLORIDE 10 MG: 10 TABLET ORAL at 22:29

## 2020-02-21 ENCOUNTER — APPOINTMENT (OUTPATIENT)
Dept: RADIOLOGY | Facility: AMBULARY SURGERY CENTER | Age: 50
End: 2020-02-21
Payer: OTHER MISCELLANEOUS

## 2020-02-21 ENCOUNTER — OFFICE VISIT (OUTPATIENT)
Dept: OBGYN CLINIC | Facility: CLINIC | Age: 50
End: 2020-02-21

## 2020-02-21 ENCOUNTER — APPOINTMENT (EMERGENCY)
Dept: RADIOLOGY | Facility: HOSPITAL | Age: 50
End: 2020-02-21
Payer: OTHER MISCELLANEOUS

## 2020-02-21 ENCOUNTER — APPOINTMENT (EMERGENCY)
Dept: NON INVASIVE DIAGNOSTICS | Facility: HOSPITAL | Age: 50
End: 2020-02-21
Payer: OTHER MISCELLANEOUS

## 2020-02-21 VITALS
BODY MASS INDEX: 26.93 KG/M2 | WEIGHT: 152 LBS | HEIGHT: 63 IN | DIASTOLIC BLOOD PRESSURE: 85 MMHG | HEART RATE: 62 BPM | SYSTOLIC BLOOD PRESSURE: 124 MMHG

## 2020-02-21 VITALS
RESPIRATION RATE: 18 BRPM | SYSTOLIC BLOOD PRESSURE: 114 MMHG | TEMPERATURE: 98.5 F | HEART RATE: 78 BPM | OXYGEN SATURATION: 98 % | DIASTOLIC BLOOD PRESSURE: 62 MMHG

## 2020-02-21 DIAGNOSIS — S92.254D CLOSED NONDISPLACED FRACTURE OF NAVICULAR BONE OF RIGHT FOOT WITH ROUTINE HEALING, SUBSEQUENT ENCOUNTER: Primary | ICD-10-CM

## 2020-02-21 DIAGNOSIS — S92.254D CLOSED NONDISPLACED FRACTURE OF NAVICULAR BONE OF RIGHT FOOT WITH ROUTINE HEALING, SUBSEQUENT ENCOUNTER: ICD-10-CM

## 2020-02-21 PROCEDURE — 93971 EXTREMITY STUDY: CPT

## 2020-02-21 PROCEDURE — 73630 X-RAY EXAM OF FOOT: CPT

## 2020-02-21 PROCEDURE — 73610 X-RAY EXAM OF ANKLE: CPT

## 2020-02-21 PROCEDURE — 99024 POSTOP FOLLOW-UP VISIT: CPT | Performed by: PHYSICAL MEDICINE & REHABILITATION

## 2020-02-21 PROCEDURE — NS001 PR NO SIGNATURE OR ATTESTATION: Performed by: ORTHOPAEDIC SURGERY

## 2020-02-21 PROCEDURE — 93971 EXTREMITY STUDY: CPT | Performed by: SURGERY

## 2020-02-21 RX ORDER — OXYCODONE AND ACETAMINOPHEN 7.5; 325 MG/1; MG/1
1 TABLET ORAL EVERY 6 HOURS PRN
Qty: 20 TABLET | Refills: 0 | Status: SHIPPED | OUTPATIENT
Start: 2020-02-21 | End: 2020-09-27 | Stop reason: ALTCHOICE

## 2020-02-21 RX ORDER — OXYCODONE HYDROCHLORIDE 10 MG/1
10 TABLET ORAL ONCE
Status: COMPLETED | OUTPATIENT
Start: 2020-02-21 | End: 2020-02-21

## 2020-02-21 RX ADMIN — OXYCODONE HYDROCHLORIDE 10 MG: 10 TABLET ORAL at 01:52

## 2020-02-21 RX ADMIN — CYCLOBENZAPRINE HYDROCHLORIDE 10 MG: 10 TABLET, FILM COATED ORAL at 00:15

## 2020-02-21 NOTE — ED NOTES
Pt moved to room 23 and placed in inpatient bed for comfort at this time  Pt will wait for day shift US team for vascular study        Rehana Terrazas RN  02/21/20 [id number]

## 2020-02-21 NOTE — PROGRESS NOTES
1  Closed nondisplaced fracture of navicular bone of right foot with routine healing, subsequent encounter  XR foot 3+ vw right    oxyCODONE-acetaminophen (PERCOCET) 7 5-325 MG per tablet     Orders Placed This Encounter   Procedures    XR foot 3+ vw right        Imaging Studies (I personally reviewed images in PACS and report):  Right foot and ankle x-rays dated 2/9/2020  Dante Burkitt images show cortical disruption of the navicular tuberosity without displacement   No other abnormalities noted  Weight-bearing right foot x-rays dated 2/21/2020  These images redemonstrate the medial navicular fracture  No other abnormalities noted      Impression:  Patient is here in follow-up of right foot pain secondary to nondisplaced navicular tuberosity fracture with date of injury as 2/8/2020   We placed her into a short-leg walking cast on 2/14/2020  She went to the ER due to pain and had her cast removed on 2/21/2020  Weight-bearing x-rays on 2/21/2020 did not show any further displacement of the fracture or other injuries  Doppler studies were normal   We checked her vitamin-D level in it was low and she is now supplementing with 2000 units daily  She should have her vitamin-D level checked in about 3-4 months  She will remain in her posterior slab splint since she is swollen and will be nonweightbearing with crutches  I will see her back in about 7-10 days  In the interim she will continue to ice and elevate as much as she can  I have also prescribed her Percocet that she can use as needed  Return in about 10 days (around 3/2/2020)  HPI:  Dallin Terry is a 53 y o  female  who presents in follow up  Here for No chief complaint on file  Date of injury: 2/8/2020-the patient fell down two steps and inverted her ankle  Trajectory of symptoms:  See above  Review of Systems   Constitutional: Positive for activity change  Negative for fever  HENT: Negative for sore throat      Eyes: Negative for visual disturbance  Respiratory: Negative for shortness of breath  Cardiovascular: Negative for chest pain  Gastrointestinal: Negative for abdominal pain  Endocrine: Negative for polydipsia  Genitourinary: Negative for difficulty urinating  Musculoskeletal: Positive for arthralgias  Skin: Negative for rash  Allergic/Immunologic: Negative for immunocompromised state  Neurological: Negative for numbness  Hematological: Does not bruise/bleed easily  Psychiatric/Behavioral: Negative for confusion  Following history reviewed and updated:  Past Medical History:   Diagnosis Date    Helicobacter pylori (H  pylori) infection      Past Surgical History:   Procedure Laterality Date    ABDOMINAL ADHESION SURGERY      lysis of peritoneal adhesions    APPENDECTOMY      CHOLECYSTECTOMY      HYSTERECTOMY      OVARY SURGERY      SALPINGECTOMY      SALPINGOOPHORECTOMY Left      Social History   Social History     Substance and Sexual Activity   Alcohol Use Yes    Comment: social     Social History     Substance and Sexual Activity   Drug Use No     Social History     Tobacco Use   Smoking Status Never Smoker   Smokeless Tobacco Never Used     Family History   Problem Relation Age of Onset    Ovarian cancer Paternal Grandmother     Arthritis Other     Cancer Other     Osteoporosis Other      Allergies   Allergen Reactions    Codeine     Morphine Hives        Constitutional:  /85   Pulse 62   Ht 5' 3" (1 6 m)   Wt 68 9 kg (152 lb)   BMI 26 93 kg/m²    General: NAD  Eyes: Clear sclerae  ENT: No inflammation, lesion, or mass of lips  No tracheal deviation  Musculoskeletal: As mentioned below  Integumentary: No visible rashes or skin lesions  Pulmonary/Chest: Effort normal  No respiratory distress  Neuro: CN's grossly intact, ASH  Psych: Normal affect and judgement  Vascular: WWP      Right Ankle Exam     Tenderness   Right ankle tenderness location: She is diffusely tender throughout the foot and ankle where there is also associated soft tissue swelling  Swelling: moderate    Range of Motion   The patient has normal right ankle ROM  Other   Erythema: absent  Scars: absent  Sensation: normal  Pulse: present     Comments:  Most tender along the navicular tuberosity  Procedures - none for this visit

## 2020-02-21 NOTE — ED NOTES
Family at bedside  Pt has been taking tylenol extra strength "they just don't do anything at all "  Pt denies new trauma to foot or area  Pt state she has been elevating it and doing all the things to help it  Pt presents with aleksandr Booth RN  02/20/20 2059

## 2020-02-21 NOTE — ED PROVIDER NOTES
History  Chief Complaint   Patient presents with    Cast Check     pt states she has pain in the area of her cast  "I haven't slept in two days"  pt states they casted on Feb 14th  Pain is from her calf all the way to the "navicular, where it's broke"     43-year-old female no past history coming today for cast check due to pain  Patient stated she slipped down to steps back on February 4th and followed up several days later due to worsening pain at urgent care for which she had a x-ray of her foot found have a navicular fracture  She got a leg cast at sports medicine during follow-up later that week  Patient has been nonweightbearing  Patient stated that for the past 4 days she has had pain beginning over her lateral malleolus that has since started radiating up her calf to her knee  She describes pain around her malleolus as sharp with cramping and shooting pains that are nerve like pinching going up her calf  She also endorses mild paresthesias of her toes on the right foot  She denies any systemic symptoms  She denies any other symptoms  She denies any history of DVT or PE  She denies any headaches, vision changes, chest pain, shortness of breath, abdominal pain, fever/chills, nausea/vomiting, or any bladder or bowel changes  Prior to Admission Medications   Prescriptions Last Dose Informant Patient Reported?  Taking?   acetaminophen (TYLENOL) 500 mg tablet   Yes No   Sig: Take by mouth   docusate sodium (COLACE) 100 mg capsule   No No   Sig: Take 1 capsule (100 mg total) by mouth every 12 (twelve) hours   Patient not taking: Reported on 2/10/2020   meloxicam (MOBIC) 7 5 mg tablet   No No   Sig: Take 1 tablet (7 5 mg total) by mouth daily   Patient not taking: Reported on 2/10/2020   omeprazole (PriLOSEC) 40 MG capsule   No No   Sig: Take 1 capsule (40 mg total) by mouth daily for 14 days   ondansetron (ZOFRAN) 8 mg tablet   Yes No   Sig: Take by mouth      Facility-Administered Medications: None       Past Medical History:   Diagnosis Date    Helicobacter pylori (H  pylori) infection        Past Surgical History:   Procedure Laterality Date    ABDOMINAL ADHESION SURGERY      lysis of peritoneal adhesions    APPENDECTOMY      CHOLECYSTECTOMY      HYSTERECTOMY      OVARY SURGERY      SALPINGECTOMY      SALPINGOOPHORECTOMY Left        Family History   Problem Relation Age of Onset    Ovarian cancer Paternal Grandmother     Arthritis Other     Cancer Other     Osteoporosis Other      I have reviewed and agree with the history as documented  Social History     Tobacco Use    Smoking status: Never Smoker    Smokeless tobacco: Never Used   Substance Use Topics    Alcohol use: Yes     Comment: social    Drug use: No        Review of Systems   Constitutional: Negative for appetite change, chills, diaphoresis, fever and unexpected weight change  HENT: Negative for congestion and rhinorrhea  Eyes: Negative for photophobia and visual disturbance  Respiratory: Negative for cough, chest tightness and shortness of breath  Cardiovascular: Negative for chest pain, palpitations and leg swelling  Gastrointestinal: Negative for abdominal distention, abdominal pain, blood in stool, constipation, diarrhea, nausea and vomiting  Genitourinary: Negative for dysuria and hematuria  Musculoskeletal: Positive for myalgias  Negative for back pain, joint swelling, neck pain and neck stiffness  Skin: Negative for color change, pallor, rash and wound  Neurological: Positive for numbness  Negative for dizziness, syncope, weakness, light-headedness and headaches  Psychiatric/Behavioral: Negative for agitation  All other systems reviewed and are negative        Physical Exam  ED Triage Vitals [02/20/20 2055]   Temperature Pulse Respirations Blood Pressure SpO2   98 5 °F (36 9 °C) 86 18 (!) 139/104 97 %      Temp Source Heart Rate Source Patient Position - Orthostatic VS BP Location FiO2 (%) Oral Monitor Lying Right arm --      Pain Score       Worst Possible Pain             Orthostatic Vital Signs  Vitals:    02/20/20 2055 02/20/20 2254 02/21/20 0630   BP: (!) 139/104 139/82 114/62   Pulse: 86 76 78   Patient Position - Orthostatic VS: Lying Lying Lying       Physical Exam   Constitutional: She is oriented to person, place, and time  She appears well-developed and well-nourished  HENT:   Head: Normocephalic and atraumatic  Nose: Nose normal    Mouth/Throat: Oropharynx is clear and moist    Eyes: Pupils are equal, round, and reactive to light  EOM are normal  Right eye exhibits no discharge  Left eye exhibits no discharge  Neck: Normal range of motion  Neck supple  No JVD present  No tracheal deviation present  Cardiovascular: Normal rate, regular rhythm, normal heart sounds and intact distal pulses  Exam reveals no gallop and no friction rub  No murmur heard  Pulmonary/Chest: Effort normal and breath sounds normal  No stridor  No respiratory distress  She has no wheezes  She has no rales  She exhibits no tenderness  Abdominal: Soft  Bowel sounds are normal  She exhibits no distension  There is no tenderness  There is no rebound and no guarding  Musculoskeletal: Normal range of motion  She exhibits edema and tenderness  She exhibits no deformity  After removal of the top part of the cast patient has mild edema and erythema over lateral malleolus  No open wound  Tenderness to palpation over lateral malleolus  Lymphadenopathy:     She has no cervical adenopathy  Neurological: She is alert and oriented to person, place, and time  No cranial nerve deficit or sensory deficit  She exhibits normal muscle tone  Coordination normal    Moderately reduced sensation in toes of the right foot  Skin: Skin is warm and dry  No rash noted  She is not diaphoretic  No erythema  No pallor  Good 2+ capillary refill in toes in the right foot   Psychiatric: She has a normal mood and affect   Her behavior is normal  Thought content normal    Nursing note and vitals reviewed  ED Medications  Medications   acetaminophen (TYLENOL) tablet 975 mg (975 mg Oral Given 2/20/20 2223)   oxyCODONE (ROXICODONE) immediate release tablet 10 mg (10 mg Oral Given 2/20/20 2229)   cyclobenzaprine (FLEXERIL) tablet 10 mg (10 mg Oral Given 2/21/20 0015)   oxyCODONE (ROXICODONE) immediate release tablet 10 mg (10 mg Oral Given 2/21/20 0152)       Diagnostic Studies  Results Reviewed     None                 VAS lower limb venous duplex study, unilateral/limited   Final Result by Ifeoma Johnson DO (02/21 1101)      XR foot 3+ views RIGHT   Final Result by Rosa Isela Valencia MD (02/21 0757)      Navicular fracture without significant change  Soft tissue swelling is noted            Workstation performed: [id number]         XR ankle 3+ views RIGHT   Final Result by Rosa Isela Valencia MD (02/21 0757)      Navicular fracture without significant change  Soft tissue swelling is noted            Workstation performed: [id number]               Procedures  Procedures      ED Course  ED Course as of Feb 25 1604   Thu Feb 20, 2020   2313 Ortho paged                                  MDM  Number of Diagnoses or Management Options  Ankle pain, right:   Encounter for cast check:   Numbness of toes:   Diagnosis management comments: 80-year-old female no past history coming today for cast check due to pain  Patient pain not improved with over-the-counter medications or elevation  Patient with continued pain emergency department  Discussed with ortho  Cast bivalved  Patient with persisting symptoms despite Tylenol and oxycodone and ice  Released some of the webril around the ankle joint and given Flexeril without improvement on reassessment  Concern for possible compartment syndrome versus DVT in addition to original concern of pressure from the cast causing pain  Formal ortho consult    Top part of the cast completely removed and imaging obtained  Ortho eval and stated no concern for compartment syndrome  Placed patient in posterior splint due to swelling  Imaging stable navicular fracture  Advised outpatient ortho follow-up  B/l LE duplex without acute process  Patient pain better controlled  Discharged with crutches and ice with return precautions, ortho follow-up, and additional instructions  Amount and/or Complexity of Data Reviewed  Clinical lab tests: ordered and reviewed  Tests in the radiology section of CPT®: ordered and reviewed  Tests in the medicine section of CPT®: ordered and reviewed  Review and summarize past medical records: yes  Independent visualization of images, tracings, or specimens: yes    Risk of Complications, Morbidity, and/or Mortality  Presenting problems: low  Diagnostic procedures: low  Management options: low    Patient Progress  Patient progress: improved        Disposition  Final diagnoses:   Encounter for cast check   Ankle pain, right   Numbness of toes     Time reflects when diagnosis was documented in both MDM as applicable and the Disposition within this note     Time User Action Codes Description Comment    2/21/2020  1:22 AM Luzma Blank Add [Z46 89] Encounter for cast check     2/21/2020  1:22 AM Luzma Blank Add [M25 571] Ankle pain, right     2/21/2020  1:23 AM Luzma Blank Add [R20 0] Numbness of toes       ED Disposition     ED Disposition Condition Date/Time Comment    Discharge Stable Fri Feb 21, 2020  [facility] 1394 discharge to home/self care              Follow-up Information     Follow up With Specialties Details Why Contact Info Additional Information    Solomon Arteaga DO General Practice Schedule an appointment as soon as possible for a visit   [address] [place] [address]  Bull Jacobs MD Sports Medicine, Orthopedic Surgery Schedule an appointment as soon as possible for a visit  For cast [facility] [address]  [facility] [address]  Emergency Department Emergency Medicine Go to  If symptoms worsen [address]  [phone]  ED, [address] [place] [address], [address]   [phone]          Discharge Medication List as of 2/21/2020  9:15 AM      CONTINUE these medications which have NOT CHANGED    Details   acetaminophen (TYLENOL) 500 mg tablet Take by mouth, Starting Thu 1/18/2018, Historical Med      docusate sodium (COLACE) 100 mg capsule Take 1 capsule (100 mg total) by mouth every 12 (twelve) hours, Starting Mon 6/11/2018, Print      meloxicam (MOBIC) 7 5 mg tablet Take 1 tablet (7 5 mg total) by mouth daily, Starting Mon 3/12/2018, Normal      omeprazole (PriLOSEC) 40 MG capsule Take 1 capsule (40 mg total) by mouth daily for 14 days, Starting Fri 2/9/2018, Until Fri 2/23/2018, Normal      ondansetron (ZOFRAN) 8 mg tablet Take by mouth, Starting Thu 1/18/2018, Historical Med           No discharge procedures on file  ED Provider  Attending physically available and evaluated [place]  I managed the patient along with the ED Attending      Electronically Signed by         Pablo Ruffin MD  02/25/20 3682

## 2020-02-21 NOTE — ED ATTENDING ATTESTATION
2/20/2020  Silvino KELLY MD, saw and evaluated the patient  I have discussed the patient with the resident/non-physician practitioner and agree with the resident's/non-physician practitioner's findings, Plan of Care, and MDM as documented in the resident's/non-physician practitioner's note, except where noted  All available labs and Radiology studies were reviewed  I was present for key portions of any procedure(s) performed by the resident/non-physician practitioner and I was immediately available to provide assistance  At this point I agree with the current assessment done in the Emergency Department  I have conducted an independent evaluation of this patient a history and physical is as follows:    50-year-old woman with recent right navicular fracture coming in today with worsening pain at the cast site  Pain is primarily over the lateral malleolus but there is some lower calf pain as well  Patient complains of some numbness and tingling to the foot  Patient denies any chest pain or shortness of breath  She is awake and alert, very uncomfortable appearing  We bivalved the cast with no relief of pain  Patient does have normal sensation to the foot with good capillary refill and is able to move her toes  The calf is supple with no evident swelling but with some tenderness to the lower calf  No erythema appreciated  Given ongoing pain out of proportion to examination will consult Orthopedics      ED Course         Critical Care Time  Procedures

## 2020-02-21 NOTE — PATIENT INSTRUCTIONS
Start Vitamin D 2,000U daily  This can be obtained over-the-counter any pharmacy, [facility] are on [employer] [facility]

## 2020-02-21 NOTE — CONSULTS
Orthopedics   Shabana Vazquez 53 y o  female MRN: [id number]  Unit/Bed#: QCB      Chief Complaint:   right foot pain    HPI:  53 y  o female with known minimally displaced fracture of the right navicular that was managed in a walking cast complaining of right foot pain  Patient has been able to bear weight and ambulate with the affected foot but her persistent pain prompted her to return to the ED    She denies any numbness or tingling to the foot and she is resting comfortably at the time of my exam      Review Of Systems:   · Skin: Normal  · Neuro: See HPI  · Musculoskeletal: See HPI  · 14 point review of systems negative except as stated above     Past Medical History:   Past Medical History:   Diagnosis Date    Helicobacter pylori (H  pylori) infection        Past Surgical History:   Past Surgical History:   Procedure Laterality Date    ABDOMINAL ADHESION SURGERY      lysis of peritoneal adhesions    APPENDECTOMY      CHOLECYSTECTOMY      HYSTERECTOMY      OVARY SURGERY      SALPINGECTOMY      SALPINGOOPHORECTOMY Left        Family History:  Family history reviewed and non-contributory  Family History   Problem Relation Age of Onset    Ovarian cancer Paternal Grandmother     Arthritis Other     Cancer Other     Osteoporosis Other        Social History:  Social History     Socioeconomic History    Marital status: Single     Spouse name: Not on file    Number of children: Not on file    Years of education: Not on file    Highest education level: Not on file   Occupational History    Not on file   Social Needs    Financial resource strain: Not on file    Food insecurity:     Worry: Not on file     Inability: Not on file    Transportation needs:     Medical: Not on file     Non-medical: Not on file   Tobacco Use    Smoking status: Never Smoker    Smokeless tobacco: Never Used   Substance and Sexual Activity    Alcohol use: Yes     Comment: social    Drug use: No    Sexual activity: Not on file Lifestyle    Physical activity:     Days per week: Not on file     Minutes per session: Not on file    Stress: Not on file   Relationships    Social connections:     Talks on phone: Not on file     Gets together: Not on file     Attends Bahai service: Not on file     Active member of club or organization: Not on file     Attends meetings of clubs or organizations: Not on file     Relationship status: Not on file    Intimate partner violence:     Fear of current or ex partner: Not on file     Emotionally abused: Not on file     Physically abused: Not on file     Forced sexual activity: Not on file   Other Topics Concern    Not on file   Social History Narrative    Not on file       Allergies: Allergies   Allergen Reactions    Codeine     Morphine Hives           Labs:  0   Lab Value Date/Time    HCT 42 9 06/11/2018 1127    HCT 43 6 01/15/2018 1501    HCT 40 6 09/18/2017 2255    HCT 30 8 (L) 03/30/2014 0538    HCT 29 4 (L) 03/29/2014 0626    HCT 29 8 (L) 03/28/2014 0510    HGB 14 5 06/11/2018 1127    HGB 15 3 01/15/2018 1501    HGB 14 1 09/18/2017 2255    HGB 10 3 (L) 03/30/2014 0538    HGB 9 8 (L) 03/29/2014 0626    HGB 9 9 (L) 03/28/2014 0510    INR 1 17 (H) 03/25/2014 2111    WBC 6 02 06/11/2018 1127    WBC 6 88 01/15/2018 1501    WBC 7 18 09/18/2017 2255    WBC 13 99 (H) 03/30/2014 0538    WBC 10 59 (H) 03/29/2014 0626    WBC 11 48 (H) 03/28/2014 0510    ESR 7 09/18/2017 2343       Meds:  No current facility-administered medications for this encounter       Current Outpatient Medications:     acetaminophen (TYLENOL) 500 mg tablet, Take by mouth, Disp: , Rfl:     docusate sodium (COLACE) 100 mg capsule, Take 1 capsule (100 mg total) by mouth every 12 (twelve) hours (Patient not taking: Reported on 2/10/2020), Disp: 60 capsule, Rfl: 0    meloxicam (MOBIC) 7 5 mg tablet, Take 1 tablet (7 5 mg total) by mouth daily (Patient not taking: Reported on 2/10/2020), Disp: 14 tablet, Rfl: 0    omeprazole (PriLOSEC) 40 MG capsule, Take 1 capsule (40 mg total) by mouth daily for 14 days, Disp: 14 capsule, Rfl: 1    ondansetron (ZOFRAN) 8 mg tablet, Take by mouth, Disp: , Rfl:     Blood Culture:   No results found for: BLOODCX    Wound Culture:   No results found for: WOUNDCULT    Ins and Outs:  No intake/output data recorded  Physical Exam:   /82 (BP Location: Right arm)   Pulse 76   Temp 98 5 °F (36 9 °C) (Oral)   Resp 17   SpO2 98%   Breastfeeding No   Gen: Alert and oriented to person, place, time  HEENT: EOMI, eyes clear, moist mucus membranes, hearing intact  Respiratory: Bilateral chest rise  No audible wheezing found  Cardiovascular: Regular Rate and intact distal pulses   Abdomen: soft nontender/nondistended  Musculoskeletal: right lower extremity  · Skin intact, moderate swelling to the medial midfoot  · Tender to palpation over navicular  · Non painful ankle ROM  · Foot soft and compressible   · No pain with passive stretch of the toes   · Sensation intact L3-S1  · Positive knee flexion/extension, ankle DF/PF, EHL/FHL  · 2+ DP/PT pulse      Radiology:   I personally reviewed the films  X-rays right foot shows no interval displacement of right navicular fracture     _*_*_*_*_*_*_*_*_*_*_*_*_*_*_*_*_*_*_*_*_*_*_*_*_*_*_*_*_*_*_*_*_*_*_*_*_*_*_*_*_*    Assessment:  53 y  o female with minimally displaced right navicular fracture    Plan:   · NWB RLE in posterior slab splint  · ICE and elevation  · Analgesics for pain  · Follow up with original physician of record in one week for skin check and possible transition back to cast once swelling has improved   · Dispo: [id number] Gabbie Regalado for discharge from [address], MD

## 2020-03-02 ENCOUNTER — OFFICE VISIT (OUTPATIENT)
Dept: OBGYN CLINIC | Facility: CLINIC | Age: 50
End: 2020-03-02

## 2020-03-02 VITALS
SYSTOLIC BLOOD PRESSURE: 147 MMHG | DIASTOLIC BLOOD PRESSURE: 89 MMHG | HEART RATE: 70 BPM | WEIGHT: 152 LBS | BODY MASS INDEX: 26.93 KG/M2 | HEIGHT: 63 IN

## 2020-03-02 DIAGNOSIS — S92.254D CLOSED NONDISPLACED FRACTURE OF NAVICULAR BONE OF RIGHT FOOT WITH ROUTINE HEALING, SUBSEQUENT ENCOUNTER: Primary | ICD-10-CM

## 2020-03-02 DIAGNOSIS — S93.491D SPRAIN OF ANTERIOR TALOFIBULAR LIGAMENT OF RIGHT ANKLE, SUBSEQUENT ENCOUNTER: ICD-10-CM

## 2020-03-02 PROBLEM — S93.491A SPRAIN OF ANTERIOR TALOFIBULAR LIGAMENT OF RIGHT ANKLE: Status: ACTIVE | Noted: 2020-03-02

## 2020-03-02 PROCEDURE — 99024 POSTOP FOLLOW-UP VISIT: CPT | Performed by: PHYSICAL MEDICINE & REHABILITATION

## 2020-03-02 NOTE — PROGRESS NOTES
1  Closed nondisplaced fracture of navicular bone of right foot with routine healing, subsequent encounter     2  Sprain of anterior talofibular ligament of right ankle, subsequent encounter       No orders of the defined types were placed in this encounter  Imaging Studies (I personally reviewed images in PACS and report):  Right foot and ankle x-rays dated 2/9/2020  Lucas Espinoza images show cortical disruption of the navicular tuberosity without displacement   No other abnormalities noted      Weight-bearing right foot x-rays dated 2/21/2020  These images redemonstrate the medial navicular fracture  No other abnormalities noted      Impression:  Patient is here in follow-up of right foot pain secondary to nondisplaced navicular tuberosity fracture with date of injury as 2/8/2020   We placed her into a short-leg walking cast on 2/14/2020  She went to the ER due to pain and had her cast removed on 2/21/2020  Weight-bearing x-rays on 2/21/2020 did not show any further displacement of the fracture or other injuries  Doppler studies were normal   We checked her vitamin-D level and it was low and she is now supplementing with 2000 units daily  She should have her vitamin-D level checked in about 3-4 months  noteworthy, the patient has been weight-bearing since her last visit while in the slab which she was not supposed to do  She is also findings consistent with an ankle sprain along with the navicular fracture that she has  Since she did not tolerate the hard cast and her compliance is questionable, we placed her into a controlled ankle motion boot  I will see her back in about 2-3 weeks and we will repeat x-rays  She can continue with Percocet as needed  Return in about 3 weeks (around 3/23/2020)  HPI:  Cristi Douglass is a 53 y o  female  who presents in follow up  Here for   Chief Complaint   Patient presents with    Follow-up       Date of injury:  2/8/2020    Trajectory of symptoms:  Doing better since her last visit  Review of Systems   Constitutional: Positive for activity change  Negative for fever  HENT: Negative for sore throat  Eyes: Negative for visual disturbance  Respiratory: Negative for shortness of breath  Cardiovascular: Negative for chest pain  Gastrointestinal: Negative for abdominal pain  Endocrine: Negative for polydipsia  Genitourinary: Negative for difficulty urinating  Musculoskeletal: Positive for arthralgias  Skin: Negative for rash  Allergic/Immunologic: Negative for immunocompromised state  Neurological: Negative for numbness  Hematological: Does not bruise/bleed easily  Psychiatric/Behavioral: Negative for confusion  Following history reviewed and updated:  Past Medical History:   Diagnosis Date    Helicobacter pylori (H  pylori) infection      Past Surgical History:   Procedure Laterality Date    ABDOMINAL ADHESION SURGERY      lysis of peritoneal adhesions    APPENDECTOMY      CHOLECYSTECTOMY      HYSTERECTOMY      OVARY SURGERY      SALPINGECTOMY      SALPINGOOPHORECTOMY Left      Social History   Social History     Substance and Sexual Activity   Alcohol Use Yes    Comment: social     Social History     Substance and Sexual Activity   Drug Use No     Social History     Tobacco Use   Smoking Status Never Smoker   Smokeless Tobacco Never Used     Family History   Problem Relation Age of Onset    Ovarian cancer Paternal Grandmother     Arthritis Other     Cancer Other     Osteoporosis Other      Allergies   Allergen Reactions    Codeine     Morphine Hives        Constitutional:  /89 (BP Location: Right arm, Patient Position: Sitting, Cuff Size: Standard)   Pulse 70   Ht 5' 3" (1 6 m)   Wt 68 9 kg (152 lb)   BMI 26 93 kg/m²    General: NAD  Eyes: Clear sclerae  ENT: No inflammation, lesion, or mass of lips  No tracheal deviation  Musculoskeletal: As mentioned below    Integumentary: No visible rashes or skin lesions  Pulmonary/Chest: Effort normal  No respiratory distress  Neuro: CN's grossly intact, ASH  Psych: Normal affect and judgement  Vascular: WWP  Right Ankle Exam     Tenderness   The patient is experiencing tenderness in the ATF (Tender along the navicular tuberosity)  Swelling: mild    Tests   Varus tilt: positive    Other   Erythema: absent  Scars: absent  Sensation: normal  Pulse: present              Procedures - none for this visit

## 2020-03-27 ENCOUNTER — OFFICE VISIT (OUTPATIENT)
Dept: OBGYN CLINIC | Facility: CLINIC | Age: 50
End: 2020-03-27

## 2020-03-27 ENCOUNTER — APPOINTMENT (OUTPATIENT)
Dept: RADIOLOGY | Facility: AMBULARY SURGERY CENTER | Age: 50
End: 2020-03-27
Payer: OTHER MISCELLANEOUS

## 2020-03-27 VITALS
SYSTOLIC BLOOD PRESSURE: 135 MMHG | WEIGHT: 152 LBS | HEIGHT: 63 IN | HEART RATE: 68 BPM | DIASTOLIC BLOOD PRESSURE: 85 MMHG | BODY MASS INDEX: 26.93 KG/M2

## 2020-03-27 DIAGNOSIS — S92.254D CLOSED NONDISPLACED FRACTURE OF NAVICULAR BONE OF RIGHT FOOT WITH ROUTINE HEALING, SUBSEQUENT ENCOUNTER: Primary | ICD-10-CM

## 2020-03-27 DIAGNOSIS — S93.491D SPRAIN OF ANTERIOR TALOFIBULAR LIGAMENT OF RIGHT ANKLE, SUBSEQUENT ENCOUNTER: ICD-10-CM

## 2020-03-27 DIAGNOSIS — S92.254D CLOSED NONDISPLACED FRACTURE OF NAVICULAR BONE OF RIGHT FOOT WITH ROUTINE HEALING, SUBSEQUENT ENCOUNTER: ICD-10-CM

## 2020-03-27 PROCEDURE — 73630 X-RAY EXAM OF FOOT: CPT

## 2020-03-27 PROCEDURE — 99024 POSTOP FOLLOW-UP VISIT: CPT | Performed by: PHYSICAL MEDICINE & REHABILITATION

## 2020-03-27 NOTE — PROGRESS NOTES
1  Closed nondisplaced fracture of navicular bone of right foot with routine healing, subsequent encounter  XR foot 3+ vw right    Ambulatory referral to Physical Therapy   2  Sprain of anterior talofibular ligament of right ankle, subsequent encounter  Ambulatory referral to Physical Therapy     Orders Placed This Encounter   Procedures    XR foot 3+ vw right    Ambulatory referral to Physical Therapy        Imaging Studies (I personally reviewed images in PACS and report):  Right foot and ankle x-rays dated 2/9/2020  Carlee Stevenso images show cortical disruption of the navicular tuberosity without displacement   No other abnormalities noted  Weight-bearing right foot x-rays dated 2/21/2020   These images redemonstrate the medial navicular fracture   No other abnormalities noted  Repeat images on 3/27/2020  These images show interval healing along the fracture plane        Impression:  Patient is here in follow-up of right foot pain secondary to nondisplaced navicular tuberosity fracture and ATFL sprain with date of injury as 2/8/2020       We placed her into a short-leg walking cast on 2/14/2020  She went to the ER due to pain and had her cast removed on 2/21/2020   Weight-bearing x-rays on 2/21/2020 did not show any further displacement of the fracture or other injuries   Doppler studies were normal   We checked her vitamin-D level and it was low and she is now supplementing with 2000 units daily   She should have her vitamin-D level checked in about 3-4 months   Regardless, her x-ray findings and exam are improved since her last visit  She is having stiffness and discomfort from wearing the cast   We will have her start physical therapy in she can transition to supportive footwear as tolerated    I will see her back in about 3 weeks and we will repeat x-rays if she is  along the navicular  Return in about 3 weeks (around 4/17/2020)      HPI:  No Mullen is a 53 y o  female  who presents in follow up   Here for   Chief Complaint   Patient presents with    Follow-up       Date of injury: 2/8/2020  Trajectory of symptoms:  Maybe slightly better  Review of Systems   Constitutional: Positive for activity change  Negative for fever  HENT: Negative for sore throat  Eyes: Negative for visual disturbance  Respiratory: Negative for shortness of breath  Cardiovascular: Negative for chest pain  Gastrointestinal: Negative for abdominal pain  Endocrine: Negative for polydipsia  Genitourinary: Negative for difficulty urinating  Musculoskeletal: Positive for arthralgias  Skin: Negative for rash  Allergic/Immunologic: Negative for immunocompromised state  Neurological: Negative for numbness  Hematological: Does not bruise/bleed easily  Psychiatric/Behavioral: Negative for confusion  Following history reviewed and updated:  Past Medical History:   Diagnosis Date    Helicobacter pylori (H  pylori) infection      Past Surgical History:   Procedure Laterality Date    ABDOMINAL ADHESION SURGERY      lysis of peritoneal adhesions    APPENDECTOMY      CHOLECYSTECTOMY      HYSTERECTOMY      OVARY SURGERY      SALPINGECTOMY      SALPINGOOPHORECTOMY Left      Social History   Social History     Substance and Sexual Activity   Alcohol Use Yes    Comment: social     Social History     Substance and Sexual Activity   Drug Use No     Social History     Tobacco Use   Smoking Status Never Smoker   Smokeless Tobacco Never Used     Family History   Problem Relation Age of Onset    Ovarian cancer Paternal Grandmother     Arthritis Other     Cancer Other     Osteoporosis Other      Allergies   Allergen Reactions    Codeine     Morphine Hives        Constitutional:  /85   Pulse 68   Ht 5' 3" (1 6 m)   Wt 68 9 kg (152 lb)   BMI 26 93 kg/m²    General: NAD  Eyes: Clear sclerae  ENT: No inflammation, lesion, or mass of lips  No tracheal deviation    Musculoskeletal: As mentioned below   Integumentary: No visible rashes or skin lesions  Pulmonary/Chest: Effort normal  No respiratory distress  Neuro: CN's grossly intact, ASH  Psych: Normal affect and judgement  Vascular: WWP  Right Ankle Exam     Tenderness   The patient is experiencing tenderness in the ATF (Posterior tibialis tendon and navicular tuberosity)  Swelling: mild    Range of Motion   The patient has normal right ankle ROM  Muscle Strength   Dorsiflexion:  5/5  Plantar flexion:  5/5  Posterior tibial:  4/5    Other   Erythema: absent  Scars: absent  Sensation: normal  Pulse: present               Procedures - none for this visit  Portions of the record may have been created with voice recognition software  Occasional wrong word or "sound a like" substitutions may have occurred due to the inherent limitations of voice recognition software  Read the chart carefully and recognize, using context, where substitutions have occurred

## 2020-04-20 ENCOUNTER — TELEPHONE (OUTPATIENT)
Dept: OBGYN CLINIC | Facility: CLINIC | Age: 50
End: 2020-04-20

## 2020-05-04 ENCOUNTER — TELEPHONE (OUTPATIENT)
Dept: OBGYN CLINIC | Facility: CLINIC | Age: 50
End: 2020-05-04

## 2020-09-26 PROCEDURE — 99284 EMERGENCY DEPT VISIT MOD MDM: CPT

## 2020-09-27 ENCOUNTER — HOSPITAL ENCOUNTER (EMERGENCY)
Facility: HOSPITAL | Age: 50
Discharge: HOME | End: 2020-09-27
Attending: EMERGENCY MEDICINE
Payer: COMMERCIAL

## 2020-09-27 ENCOUNTER — APPOINTMENT (EMERGENCY)
Dept: CT IMAGING | Facility: HOSPITAL | Age: 50
End: 2020-09-27
Payer: COMMERCIAL

## 2020-09-27 VITALS
HEART RATE: 54 BPM | SYSTOLIC BLOOD PRESSURE: 125 MMHG | WEIGHT: 141.76 LBS | OXYGEN SATURATION: 96 % | TEMPERATURE: 98.2 F | HEIGHT: 62 IN | RESPIRATION RATE: 15 BRPM | DIASTOLIC BLOOD PRESSURE: 62 MMHG | BODY MASS INDEX: 26.09 KG/M2

## 2020-09-27 DIAGNOSIS — R10.9 FLANK PAIN: Primary | ICD-10-CM

## 2020-09-27 LAB
ALBUMIN SERPL BCP-MCNC: 3.8 G/DL (ref 3.5–5)
ALP SERPL-CCNC: 54 U/L (ref 46–116)
ALT SERPL W P-5'-P-CCNC: 59 U/L (ref 12–78)
ANION GAP SERPL CALCULATED.3IONS-SCNC: 10 MMOL/L (ref 4–13)
AST SERPL W P-5'-P-CCNC: 45 U/L (ref 5–45)
BACTERIA UR QL AUTO: NORMAL /HPF
BASOPHILS # BLD AUTO: 0.04 THOUSANDS/ΜL (ref 0–0.1)
BASOPHILS NFR BLD AUTO: 1 % (ref 0–1)
BILIRUB SERPL-MCNC: 0.25 MG/DL (ref 0.2–1)
BILIRUB UR QL STRIP: NEGATIVE
BUN SERPL-MCNC: 14 MG/DL (ref 5–25)
CALCIUM SERPL-MCNC: 8.6 MG/DL (ref 8.3–10.1)
CHLORIDE SERPL-SCNC: 105 MMOL/L (ref 100–108)
CLARITY UR: CLEAR
CO2 SERPL-SCNC: 28 MMOL/L (ref 21–32)
COLOR UR: YELLOW
CREAT SERPL-MCNC: 0.83 MG/DL (ref 0.6–1.3)
EOSINOPHIL # BLD AUTO: 0.14 THOUSAND/ΜL (ref 0–0.61)
EOSINOPHIL NFR BLD AUTO: 2 % (ref 0–6)
ERYTHROCYTE [DISTWIDTH] IN BLOOD BY AUTOMATED COUNT: 11.7 % (ref 11.6–15.1)
EXT PREG TEST URINE: NEGATIVE
EXT. CONTROL ED NAV: NORMAL
GFR SERPL CREATININE-BSD FRML MDRD: 83 ML/MIN/1.73SQ M
GLUCOSE SERPL-MCNC: 104 MG/DL (ref 65–140)
GLUCOSE UR STRIP-MCNC: NEGATIVE MG/DL
HCT VFR BLD AUTO: 40.4 % (ref 34.8–46.1)
HGB BLD-MCNC: 13.6 G/DL (ref 11.5–15.4)
HGB UR QL STRIP.AUTO: ABNORMAL
IMM GRANULOCYTES # BLD AUTO: 0.01 THOUSAND/UL (ref 0–0.2)
IMM GRANULOCYTES NFR BLD AUTO: 0 % (ref 0–2)
KETONES UR STRIP-MCNC: NEGATIVE MG/DL
LEUKOCYTE ESTERASE UR QL STRIP: NEGATIVE
LIPASE SERPL-CCNC: 201 U/L (ref 73–393)
LYMPHOCYTES # BLD AUTO: 3.8 THOUSANDS/ΜL (ref 0.6–4.47)
LYMPHOCYTES NFR BLD AUTO: 51 % (ref 14–44)
MCH RBC QN AUTO: 30.8 PG (ref 26.8–34.3)
MCHC RBC AUTO-ENTMCNC: 33.7 G/DL (ref 31.4–37.4)
MCV RBC AUTO: 92 FL (ref 82–98)
MONOCYTES # BLD AUTO: 0.61 THOUSAND/ΜL (ref 0.17–1.22)
MONOCYTES NFR BLD AUTO: 8 % (ref 4–12)
NEUTROPHILS # BLD AUTO: 2.86 THOUSANDS/ΜL (ref 1.85–7.62)
NEUTS SEG NFR BLD AUTO: 38 % (ref 43–75)
NITRITE UR QL STRIP: NEGATIVE
NON-SQ EPI CELLS URNS QL MICRO: NORMAL /HPF
NRBC BLD AUTO-RTO: 0 /100 WBCS
PH UR STRIP.AUTO: 7 [PH] (ref 4.5–8)
PLATELET # BLD AUTO: 196 THOUSANDS/UL (ref 149–390)
PMV BLD AUTO: 10.4 FL (ref 8.9–12.7)
POTASSIUM SERPL-SCNC: 3.4 MMOL/L (ref 3.5–5.3)
PROT SERPL-MCNC: 7.6 G/DL (ref 6.4–8.2)
PROT UR STRIP-MCNC: NEGATIVE MG/DL
RBC # BLD AUTO: 4.41 MILLION/UL (ref 3.81–5.12)
RBC #/AREA URNS AUTO: NORMAL /HPF
SODIUM SERPL-SCNC: 143 MMOL/L (ref 136–145)
SP GR UR STRIP.AUTO: <=1.005 (ref 1–1.03)
UROBILINOGEN UR QL STRIP.AUTO: 0.2 E.U./DL
WBC # BLD AUTO: 7.46 THOUSAND/UL (ref 4.31–10.16)
WBC #/AREA URNS AUTO: NORMAL /HPF

## 2020-09-27 PROCEDURE — 96375 TX/PRO/DX INJ NEW DRUG ADDON: CPT

## 2020-09-27 PROCEDURE — 36415 COLL VENOUS BLD VENIPUNCTURE: CPT

## 2020-09-27 PROCEDURE — G1004 CDSM NDSC: HCPCS

## 2020-09-27 PROCEDURE — 81025 URINE PREGNANCY TEST: CPT | Performed by: EMERGENCY MEDICINE

## 2020-09-27 PROCEDURE — 81001 URINALYSIS AUTO W/SCOPE: CPT

## 2020-09-27 PROCEDURE — 96374 THER/PROPH/DIAG INJ IV PUSH: CPT

## 2020-09-27 PROCEDURE — 74176 CT ABD & PELVIS W/O CONTRAST: CPT

## 2020-09-27 PROCEDURE — 99284 EMERGENCY DEPT VISIT MOD MDM: CPT | Performed by: PHYSICIAN ASSISTANT

## 2020-09-27 PROCEDURE — 85025 COMPLETE CBC W/AUTO DIFF WBC: CPT | Performed by: EMERGENCY MEDICINE

## 2020-09-27 PROCEDURE — 83690 ASSAY OF LIPASE: CPT | Performed by: EMERGENCY MEDICINE

## 2020-09-27 PROCEDURE — 80053 COMPREHEN METABOLIC PANEL: CPT | Performed by: EMERGENCY MEDICINE

## 2020-09-27 RX ORDER — CYCLOBENZAPRINE HCL 10 MG
10 TABLET ORAL ONCE
Status: COMPLETED | OUTPATIENT
Start: 2020-09-27 | End: 2020-09-27

## 2020-09-27 RX ORDER — CAPSAICIN 0.07 G/100G
CREAM TOPICAL 3 TIMES DAILY
Qty: 28.3 G | Refills: 0 | Status: SHIPPED | OUTPATIENT
Start: 2020-09-27 | End: 2020-10-24 | Stop reason: ALTCHOICE

## 2020-09-27 RX ORDER — LIDOCAINE 50 MG/G
2 PATCH TOPICAL ONCE
Status: DISCONTINUED | OUTPATIENT
Start: 2020-09-27 | End: 2020-09-27 | Stop reason: HOSPADM

## 2020-09-27 RX ORDER — KETOROLAC TROMETHAMINE 30 MG/ML
15 INJECTION, SOLUTION INTRAMUSCULAR; INTRAVENOUS ONCE
Status: COMPLETED | OUTPATIENT
Start: 2020-09-27 | End: 2020-09-27

## 2020-09-27 RX ORDER — ONDANSETRON 2 MG/ML
4 INJECTION INTRAMUSCULAR; INTRAVENOUS ONCE
Status: COMPLETED | OUTPATIENT
Start: 2020-09-27 | End: 2020-09-27

## 2020-09-27 RX ORDER — CYCLOBENZAPRINE HCL 10 MG
10 TABLET ORAL 2 TIMES DAILY PRN
Qty: 10 TABLET | Refills: 0 | Status: SHIPPED | OUTPATIENT
Start: 2020-09-27 | End: 2020-10-24 | Stop reason: ALTCHOICE

## 2020-09-27 RX ADMIN — LIDOCAINE 5% 2 PATCH: 700 PATCH TOPICAL at 03:26

## 2020-09-27 RX ADMIN — ONDANSETRON 4 MG: 2 INJECTION INTRAMUSCULAR; INTRAVENOUS at 01:50

## 2020-09-27 RX ADMIN — CYCLOBENZAPRINE HYDROCHLORIDE 10 MG: 10 TABLET, FILM COATED ORAL at 03:25

## 2020-09-27 RX ADMIN — KETOROLAC TROMETHAMINE 15 MG: 30 INJECTION, SOLUTION INTRAMUSCULAR at 01:49

## 2020-09-30 ENCOUNTER — TELEPHONE (OUTPATIENT)
Dept: PHYSICAL THERAPY | Facility: OTHER | Age: 50
End: 2020-09-30

## 2020-09-30 NOTE — TELEPHONE ENCOUNTER
Nurse attempted to contact pt via only number listed in pts demographics 3x  Each time it states the call cannot be completed      Will defer for f/u attempt

## 2020-10-07 ENCOUNTER — TELEPHONE (OUTPATIENT)
Dept: PHYSICAL THERAPY | Facility: OTHER | Age: 50
End: 2020-10-07

## 2020-10-24 ENCOUNTER — APPOINTMENT (EMERGENCY)
Dept: CT IMAGING | Facility: HOSPITAL | Age: 50
End: 2020-10-24
Payer: COMMERCIAL

## 2020-10-24 ENCOUNTER — HOSPITAL ENCOUNTER (EMERGENCY)
Facility: HOSPITAL | Age: 50
Discharge: HOME | End: 2020-10-24
Attending: EMERGENCY MEDICINE | Admitting: EMERGENCY MEDICINE
Payer: COMMERCIAL

## 2020-10-24 VITALS
TEMPERATURE: 98.4 F | BODY MASS INDEX: 25.6 KG/M2 | OXYGEN SATURATION: 97 % | RESPIRATION RATE: 16 BRPM | SYSTOLIC BLOOD PRESSURE: 123 MMHG | WEIGHT: 139.99 LBS | DIASTOLIC BLOOD PRESSURE: 78 MMHG | HEART RATE: 78 BPM

## 2020-10-24 DIAGNOSIS — K57.92 DIVERTICULITIS: Primary | ICD-10-CM

## 2020-10-24 LAB
ALBUMIN SERPL BCP-MCNC: 3.9 G/DL (ref 3.5–5)
ALP SERPL-CCNC: 59 U/L (ref 46–116)
ALT SERPL W P-5'-P-CCNC: 54 U/L (ref 12–78)
ANION GAP SERPL CALCULATED.3IONS-SCNC: 8 MMOL/L (ref 4–13)
AST SERPL W P-5'-P-CCNC: 33 U/L (ref 5–45)
BACTERIA UR QL AUTO: NORMAL /HPF
BASOPHILS # BLD AUTO: 0.03 THOUSANDS/ΜL (ref 0–0.1)
BASOPHILS NFR BLD AUTO: 0 % (ref 0–1)
BILIRUB SERPL-MCNC: 0.6 MG/DL (ref 0.2–1)
BILIRUB UR QL STRIP: NEGATIVE
BUN SERPL-MCNC: 14 MG/DL (ref 5–25)
CALCIUM SERPL-MCNC: 9 MG/DL (ref 8.3–10.1)
CHLORIDE SERPL-SCNC: 105 MMOL/L (ref 100–108)
CLARITY UR: CLEAR
CO2 SERPL-SCNC: 27 MMOL/L (ref 21–32)
COLOR UR: YELLOW
CREAT SERPL-MCNC: 0.89 MG/DL (ref 0.6–1.3)
EOSINOPHIL # BLD AUTO: 0.06 THOUSAND/ΜL (ref 0–0.61)
EOSINOPHIL NFR BLD AUTO: 1 % (ref 0–6)
ERYTHROCYTE [DISTWIDTH] IN BLOOD BY AUTOMATED COUNT: 11.9 % (ref 11.6–15.1)
GFR SERPL CREATININE-BSD FRML MDRD: 76 ML/MIN/1.73SQ M
GLUCOSE SERPL-MCNC: 95 MG/DL (ref 65–140)
GLUCOSE UR STRIP-MCNC: NEGATIVE MG/DL
HCT VFR BLD AUTO: 44.6 % (ref 34.8–46.1)
HGB BLD-MCNC: 14.8 G/DL (ref 11.5–15.4)
HGB UR QL STRIP.AUTO: ABNORMAL
IMM GRANULOCYTES # BLD AUTO: 0.02 THOUSAND/UL (ref 0–0.2)
IMM GRANULOCYTES NFR BLD AUTO: 0 % (ref 0–2)
KETONES UR STRIP-MCNC: NEGATIVE MG/DL
LEUKOCYTE ESTERASE UR QL STRIP: NEGATIVE
LIPASE SERPL-CCNC: 132 U/L (ref 73–393)
LYMPHOCYTES # BLD AUTO: 2.03 THOUSANDS/ΜL (ref 0.6–4.47)
LYMPHOCYTES NFR BLD AUTO: 20 % (ref 14–44)
MCH RBC QN AUTO: 30.8 PG (ref 26.8–34.3)
MCHC RBC AUTO-ENTMCNC: 33.2 G/DL (ref 31.4–37.4)
MCV RBC AUTO: 93 FL (ref 82–98)
MONOCYTES # BLD AUTO: 0.69 THOUSAND/ΜL (ref 0.17–1.22)
MONOCYTES NFR BLD AUTO: 7 % (ref 4–12)
NEUTROPHILS # BLD AUTO: 7.1 THOUSANDS/ΜL (ref 1.85–7.62)
NEUTS SEG NFR BLD AUTO: 72 % (ref 43–75)
NITRITE UR QL STRIP: NEGATIVE
NON-SQ EPI CELLS URNS QL MICRO: NORMAL /HPF
NRBC BLD AUTO-RTO: 0 /100 WBCS
PH UR STRIP.AUTO: 6 [PH]
PLATELET # BLD AUTO: 172 THOUSANDS/UL (ref 149–390)
PMV BLD AUTO: 10.5 FL (ref 8.9–12.7)
POTASSIUM SERPL-SCNC: 3.9 MMOL/L (ref 3.5–5.3)
PROT SERPL-MCNC: 8 G/DL (ref 6.4–8.2)
PROT UR STRIP-MCNC: NEGATIVE MG/DL
RBC # BLD AUTO: 4.81 MILLION/UL (ref 3.81–5.12)
RBC #/AREA URNS AUTO: NORMAL /HPF
SODIUM SERPL-SCNC: 140 MMOL/L (ref 136–145)
SP GR UR STRIP.AUTO: 1.01 (ref 1–1.03)
UROBILINOGEN UR QL STRIP.AUTO: 0.2 E.U./DL
WBC # BLD AUTO: 9.93 THOUSAND/UL (ref 4.31–10.16)
WBC #/AREA URNS AUTO: NORMAL /HPF

## 2020-10-24 PROCEDURE — 96375 TX/PRO/DX INJ NEW DRUG ADDON: CPT

## 2020-10-24 PROCEDURE — 81001 URINALYSIS AUTO W/SCOPE: CPT | Performed by: EMERGENCY MEDICINE

## 2020-10-24 PROCEDURE — 96361 HYDRATE IV INFUSION ADD-ON: CPT

## 2020-10-24 PROCEDURE — G1004 CDSM NDSC: HCPCS

## 2020-10-24 PROCEDURE — 99284 EMERGENCY DEPT VISIT MOD MDM: CPT | Performed by: EMERGENCY MEDICINE

## 2020-10-24 PROCEDURE — 83690 ASSAY OF LIPASE: CPT | Performed by: EMERGENCY MEDICINE

## 2020-10-24 PROCEDURE — 99284 EMERGENCY DEPT VISIT MOD MDM: CPT

## 2020-10-24 PROCEDURE — 96374 THER/PROPH/DIAG INJ IV PUSH: CPT

## 2020-10-24 PROCEDURE — 36415 COLL VENOUS BLD VENIPUNCTURE: CPT | Performed by: EMERGENCY MEDICINE

## 2020-10-24 PROCEDURE — 80053 COMPREHEN METABOLIC PANEL: CPT | Performed by: EMERGENCY MEDICINE

## 2020-10-24 PROCEDURE — 74177 CT ABD & PELVIS W/CONTRAST: CPT

## 2020-10-24 PROCEDURE — 85025 COMPLETE CBC W/AUTO DIFF WBC: CPT | Performed by: EMERGENCY MEDICINE

## 2020-10-24 RX ORDER — METRONIDAZOLE 500 MG/1
500 TABLET ORAL EVERY 8 HOURS SCHEDULED
Qty: 30 TABLET | Refills: 0 | Status: SHIPPED | OUTPATIENT
Start: 2020-10-24 | End: 2020-11-03

## 2020-10-24 RX ORDER — CIPROFLOXACIN 500 MG/1
500 TABLET, FILM COATED ORAL EVERY 12 HOURS
Qty: 20 TABLET | Refills: 0 | Status: SHIPPED | OUTPATIENT
Start: 2020-10-24 | End: 2020-11-03

## 2020-10-24 RX ORDER — KETOROLAC TROMETHAMINE 30 MG/ML
15 INJECTION, SOLUTION INTRAMUSCULAR; INTRAVENOUS ONCE
Status: COMPLETED | OUTPATIENT
Start: 2020-10-24 | End: 2020-10-24

## 2020-10-24 RX ORDER — METOCLOPRAMIDE HYDROCHLORIDE 5 MG/ML
10 INJECTION INTRAMUSCULAR; INTRAVENOUS ONCE
Status: COMPLETED | OUTPATIENT
Start: 2020-10-24 | End: 2020-10-24

## 2020-10-24 RX ADMIN — IOHEXOL 100 ML: 350 INJECTION, SOLUTION INTRAVENOUS at 11:38

## 2020-10-24 RX ADMIN — KETOROLAC TROMETHAMINE 15 MG: 30 INJECTION, SOLUTION INTRAMUSCULAR at 10:43

## 2020-10-24 RX ADMIN — METOCLOPRAMIDE HYDROCHLORIDE 10 MG: 5 INJECTION INTRAMUSCULAR; INTRAVENOUS at 10:44

## 2020-10-24 RX ADMIN — SODIUM CHLORIDE 1000 ML: 0.9 INJECTION, SOLUTION INTRAVENOUS at 10:43

## 2020-11-03 ENCOUNTER — APPOINTMENT (EMERGENCY)
Dept: CT IMAGING | Facility: HOSPITAL | Age: 50
End: 2020-11-03
Payer: COMMERCIAL

## 2020-11-03 ENCOUNTER — HOSPITAL ENCOUNTER (EMERGENCY)
Facility: HOSPITAL | Age: 50
Discharge: HOME | End: 2020-11-03
Attending: EMERGENCY MEDICINE | Admitting: EMERGENCY MEDICINE
Payer: COMMERCIAL

## 2020-11-03 VITALS
DIASTOLIC BLOOD PRESSURE: 66 MMHG | TEMPERATURE: 98.6 F | RESPIRATION RATE: 18 BRPM | OXYGEN SATURATION: 98 % | WEIGHT: 136.4 LBS | BODY MASS INDEX: 25.1 KG/M2 | HEART RATE: 59 BPM | SYSTOLIC BLOOD PRESSURE: 125 MMHG | HEIGHT: 62 IN

## 2020-11-03 DIAGNOSIS — R10.9 ABDOMINAL PAIN: Primary | ICD-10-CM

## 2020-11-03 LAB
ALBUMIN SERPL BCP-MCNC: 3.9 G/DL (ref 3.5–5)
ALP SERPL-CCNC: 56 U/L (ref 46–116)
ALT SERPL W P-5'-P-CCNC: 69 U/L (ref 12–78)
ANION GAP SERPL CALCULATED.3IONS-SCNC: 8 MMOL/L (ref 4–13)
AST SERPL W P-5'-P-CCNC: 68 U/L (ref 5–45)
BASOPHILS # BLD AUTO: 0.04 THOUSANDS/ΜL (ref 0–0.1)
BASOPHILS NFR BLD AUTO: 1 % (ref 0–1)
BILIRUB SERPL-MCNC: 0.54 MG/DL (ref 0.2–1)
BUN SERPL-MCNC: 11 MG/DL (ref 5–25)
CALCIUM SERPL-MCNC: 9.3 MG/DL (ref 8.3–10.1)
CHLORIDE SERPL-SCNC: 105 MMOL/L (ref 100–108)
CO2 SERPL-SCNC: 28 MMOL/L (ref 21–32)
CREAT SERPL-MCNC: 1.01 MG/DL (ref 0.6–1.3)
EOSINOPHIL # BLD AUTO: 0.06 THOUSAND/ΜL (ref 0–0.61)
EOSINOPHIL NFR BLD AUTO: 1 % (ref 0–6)
ERYTHROCYTE [DISTWIDTH] IN BLOOD BY AUTOMATED COUNT: 12.2 % (ref 11.6–15.1)
GFR SERPL CREATININE-BSD FRML MDRD: 66 ML/MIN/1.73SQ M
GLUCOSE SERPL-MCNC: 97 MG/DL (ref 65–140)
HCT VFR BLD AUTO: 43.1 % (ref 34.8–46.1)
HGB BLD-MCNC: 14.4 G/DL (ref 11.5–15.4)
IMM GRANULOCYTES # BLD AUTO: 0.01 THOUSAND/UL (ref 0–0.2)
IMM GRANULOCYTES NFR BLD AUTO: 0 % (ref 0–2)
LACTATE SERPL-SCNC: 0.7 MMOL/L (ref 0.5–2)
LIPASE SERPL-CCNC: 118 U/L (ref 73–393)
LYMPHOCYTES # BLD AUTO: 2.28 THOUSANDS/ΜL (ref 0.6–4.47)
LYMPHOCYTES NFR BLD AUTO: 43 % (ref 14–44)
MCH RBC QN AUTO: 31 PG (ref 26.8–34.3)
MCHC RBC AUTO-ENTMCNC: 33.4 G/DL (ref 31.4–37.4)
MCV RBC AUTO: 93 FL (ref 82–98)
MONOCYTES # BLD AUTO: 0.38 THOUSAND/ΜL (ref 0.17–1.22)
MONOCYTES NFR BLD AUTO: 7 % (ref 4–12)
NEUTROPHILS # BLD AUTO: 2.55 THOUSANDS/ΜL (ref 1.85–7.62)
NEUTS SEG NFR BLD AUTO: 48 % (ref 43–75)
NRBC BLD AUTO-RTO: 0 /100 WBCS
PLATELET # BLD AUTO: 237 THOUSANDS/UL (ref 149–390)
PMV BLD AUTO: 10.7 FL (ref 8.9–12.7)
POTASSIUM SERPL-SCNC: 4 MMOL/L (ref 3.5–5.3)
PROT SERPL-MCNC: 8.1 G/DL (ref 6.4–8.2)
RBC # BLD AUTO: 4.65 MILLION/UL (ref 3.81–5.12)
SODIUM SERPL-SCNC: 141 MMOL/L (ref 136–145)
WBC # BLD AUTO: 5.32 THOUSAND/UL (ref 4.31–10.16)

## 2020-11-03 PROCEDURE — 96374 THER/PROPH/DIAG INJ IV PUSH: CPT

## 2020-11-03 PROCEDURE — 80053 COMPREHEN METABOLIC PANEL: CPT | Performed by: PHYSICIAN ASSISTANT

## 2020-11-03 PROCEDURE — 74177 CT ABD & PELVIS W/CONTRAST: CPT

## 2020-11-03 PROCEDURE — 36415 COLL VENOUS BLD VENIPUNCTURE: CPT | Performed by: PHYSICIAN ASSISTANT

## 2020-11-03 PROCEDURE — 96361 HYDRATE IV INFUSION ADD-ON: CPT

## 2020-11-03 PROCEDURE — 83690 ASSAY OF LIPASE: CPT | Performed by: PHYSICIAN ASSISTANT

## 2020-11-03 PROCEDURE — 99285 EMERGENCY DEPT VISIT HI MDM: CPT | Performed by: PHYSICIAN ASSISTANT

## 2020-11-03 PROCEDURE — 83605 ASSAY OF LACTIC ACID: CPT | Performed by: PHYSICIAN ASSISTANT

## 2020-11-03 PROCEDURE — 85025 COMPLETE CBC W/AUTO DIFF WBC: CPT | Performed by: PHYSICIAN ASSISTANT

## 2020-11-03 PROCEDURE — G1004 CDSM NDSC: HCPCS

## 2020-11-03 PROCEDURE — 96375 TX/PRO/DX INJ NEW DRUG ADDON: CPT

## 2020-11-03 PROCEDURE — 99284 EMERGENCY DEPT VISIT MOD MDM: CPT

## 2020-11-03 RX ORDER — KETOROLAC TROMETHAMINE 30 MG/ML
15 INJECTION, SOLUTION INTRAMUSCULAR; INTRAVENOUS ONCE
Status: COMPLETED | OUTPATIENT
Start: 2020-11-03 | End: 2020-11-03

## 2020-11-03 RX ORDER — ONDANSETRON 2 MG/ML
4 INJECTION INTRAMUSCULAR; INTRAVENOUS ONCE
Status: COMPLETED | OUTPATIENT
Start: 2020-11-03 | End: 2020-11-03

## 2020-11-03 RX ORDER — HYDROMORPHONE HCL/PF 1 MG/ML
0.5 SYRINGE (ML) INJECTION ONCE
Status: COMPLETED | OUTPATIENT
Start: 2020-11-03 | End: 2020-11-03

## 2020-11-03 RX ORDER — DICYCLOMINE HCL 20 MG
20 TABLET ORAL 2 TIMES DAILY
Qty: 20 TABLET | Refills: 0 | Status: SHIPPED | OUTPATIENT
Start: 2020-11-03

## 2020-11-03 RX ADMIN — ONDANSETRON 4 MG: 2 INJECTION INTRAMUSCULAR; INTRAVENOUS at 11:42

## 2020-11-03 RX ADMIN — KETOROLAC TROMETHAMINE 15 MG: 30 INJECTION, SOLUTION INTRAMUSCULAR at 11:42

## 2020-11-03 RX ADMIN — SODIUM CHLORIDE 1000 ML: 0.9 INJECTION, SOLUTION INTRAVENOUS at 11:40

## 2020-11-03 RX ADMIN — IOHEXOL 100 ML: 350 INJECTION, SOLUTION INTRAVENOUS at 13:15

## 2020-11-03 RX ADMIN — HYDROMORPHONE HYDROCHLORIDE 0.5 MG: 1 INJECTION, SOLUTION INTRAMUSCULAR; INTRAVENOUS; SUBCUTANEOUS at 12:27

## 2020-11-18 ENCOUNTER — OFFICE VISIT (OUTPATIENT)
Dept: URGENT CARE | Facility: CLINIC | Age: 50
End: 2020-11-18
Payer: COMMERCIAL

## 2020-11-18 ENCOUNTER — NURSE TRIAGE (OUTPATIENT)
Dept: OTHER | Facility: OTHER | Age: 50
End: 2020-11-18

## 2020-11-18 VITALS — HEART RATE: 96 BPM | TEMPERATURE: 99.3 F | OXYGEN SATURATION: 97 %

## 2020-11-18 DIAGNOSIS — Z20.822 ENCOUNTER FOR SCREENING LABORATORY TESTING FOR COVID-19 VIRUS: Primary | ICD-10-CM

## 2020-11-18 DIAGNOSIS — B37.0 ORAL CANDIDIASIS: ICD-10-CM

## 2020-11-18 PROCEDURE — G0382 LEV 3 HOSP TYPE B ED VISIT: HCPCS | Performed by: NURSE PRACTITIONER

## 2020-11-18 PROCEDURE — U0003 INFECTIOUS AGENT DETECTION BY NUCLEIC ACID (DNA OR RNA); SEVERE ACUTE RESPIRATORY SYNDROME CORONAVIRUS 2 (SARS-COV-2) (CORONAVIRUS DISEASE [COVID-19]), AMPLIFIED PROBE TECHNIQUE, MAKING USE OF HIGH THROUGHPUT TECHNOLOGIES AS DESCRIBED BY CMS-2020-01-R: HCPCS | Performed by: NURSE PRACTITIONER

## 2020-11-19 LAB — SARS-COV-2 RNA SPEC QL NAA+PROBE: NOT DETECTED

## 2021-02-03 ENCOUNTER — HOSPITAL ENCOUNTER (EMERGENCY)
Facility: HOSPITAL | Age: 51
Discharge: HOME | End: 2021-02-03
Attending: EMERGENCY MEDICINE | Admitting: EMERGENCY MEDICINE
Payer: COMMERCIAL

## 2021-02-03 ENCOUNTER — APPOINTMENT (EMERGENCY)
Dept: VASCULAR ULTRASOUND | Facility: HOSPITAL | Age: 51
End: 2021-02-03
Payer: COMMERCIAL

## 2021-02-03 ENCOUNTER — APPOINTMENT (EMERGENCY)
Dept: RADIOLOGY | Facility: HOSPITAL | Age: 51
End: 2021-02-03
Payer: COMMERCIAL

## 2021-02-03 VITALS
OXYGEN SATURATION: 99 % | DIASTOLIC BLOOD PRESSURE: 64 MMHG | SYSTOLIC BLOOD PRESSURE: 127 MMHG | BODY MASS INDEX: 25.02 KG/M2 | RESPIRATION RATE: 16 BRPM | TEMPERATURE: 98.2 F | HEART RATE: 65 BPM | WEIGHT: 136.8 LBS

## 2021-02-03 DIAGNOSIS — R07.9 CHEST PAIN: Primary | ICD-10-CM

## 2021-02-03 DIAGNOSIS — K21.9 GERD (GASTROESOPHAGEAL REFLUX DISEASE): ICD-10-CM

## 2021-02-03 LAB
ALBUMIN SERPL BCP-MCNC: 3.8 G/DL (ref 3.5–5)
ALP SERPL-CCNC: 55 U/L (ref 46–116)
ALT SERPL W P-5'-P-CCNC: 76 U/L (ref 12–78)
ANION GAP SERPL CALCULATED.3IONS-SCNC: 8 MMOL/L (ref 4–13)
AST SERPL W P-5'-P-CCNC: 55 U/L (ref 5–45)
BASOPHILS # BLD AUTO: 0.03 THOUSANDS/ΜL (ref 0–0.1)
BASOPHILS NFR BLD AUTO: 1 % (ref 0–1)
BILIRUB SERPL-MCNC: 0.58 MG/DL (ref 0.2–1)
BUN SERPL-MCNC: 11 MG/DL (ref 5–25)
CALCIUM SERPL-MCNC: 9 MG/DL (ref 8.3–10.1)
CHLORIDE SERPL-SCNC: 103 MMOL/L (ref 100–108)
CO2 SERPL-SCNC: 28 MMOL/L (ref 21–32)
CREAT SERPL-MCNC: 0.94 MG/DL (ref 0.6–1.3)
D DIMER PPP FEU-MCNC: <0.27 UG/ML FEU
EOSINOPHIL # BLD AUTO: 0.09 THOUSAND/ΜL (ref 0–0.61)
EOSINOPHIL NFR BLD AUTO: 2 % (ref 0–6)
ERYTHROCYTE [DISTWIDTH] IN BLOOD BY AUTOMATED COUNT: 11.8 % (ref 11.6–15.1)
GFR SERPL CREATININE-BSD FRML MDRD: 71 ML/MIN/1.73SQ M
GLUCOSE SERPL-MCNC: 96 MG/DL (ref 65–140)
HCT VFR BLD AUTO: 43.5 % (ref 34.8–46.1)
HGB BLD-MCNC: 14.4 G/DL (ref 11.5–15.4)
IMM GRANULOCYTES # BLD AUTO: 0.01 THOUSAND/UL (ref 0–0.2)
IMM GRANULOCYTES NFR BLD AUTO: 0 % (ref 0–2)
LYMPHOCYTES # BLD AUTO: 2.33 THOUSANDS/ΜL (ref 0.6–4.47)
LYMPHOCYTES NFR BLD AUTO: 44 % (ref 14–44)
MCH RBC QN AUTO: 30.6 PG (ref 26.8–34.3)
MCHC RBC AUTO-ENTMCNC: 33.1 G/DL (ref 31.4–37.4)
MCV RBC AUTO: 93 FL (ref 82–98)
MONOCYTES # BLD AUTO: 0.49 THOUSAND/ΜL (ref 0.17–1.22)
MONOCYTES NFR BLD AUTO: 10 % (ref 4–12)
NEUTROPHILS # BLD AUTO: 2.2 THOUSANDS/ΜL (ref 1.85–7.62)
NEUTS SEG NFR BLD AUTO: 43 % (ref 43–75)
NRBC BLD AUTO-RTO: 0 /100 WBCS
PLATELET # BLD AUTO: 165 THOUSANDS/UL (ref 149–390)
PMV BLD AUTO: 10.5 FL (ref 8.9–12.7)
POTASSIUM SERPL-SCNC: 4 MMOL/L (ref 3.5–5.3)
PROT SERPL-MCNC: 7.8 G/DL (ref 6.4–8.2)
RBC # BLD AUTO: 4.7 MILLION/UL (ref 3.81–5.12)
SODIUM SERPL-SCNC: 139 MMOL/L (ref 136–145)
TROPONIN I SERPL-MCNC: <0.02 NG/ML
TSH SERPL DL<=0.05 MIU/L-ACNC: 1.3 UIU/ML (ref 0.36–3.74)
WBC # BLD AUTO: 5.15 THOUSAND/UL (ref 4.31–10.16)

## 2021-02-03 PROCEDURE — 85379 FIBRIN DEGRADATION QUANT: CPT | Performed by: EMERGENCY MEDICINE

## 2021-02-03 PROCEDURE — 93971 EXTREMITY STUDY: CPT | Performed by: SURGERY

## 2021-02-03 PROCEDURE — 71045 X-RAY EXAM CHEST 1 VIEW: CPT

## 2021-02-03 PROCEDURE — 85025 COMPLETE CBC W/AUTO DIFF WBC: CPT | Performed by: EMERGENCY MEDICINE

## 2021-02-03 PROCEDURE — 84484 ASSAY OF TROPONIN QUANT: CPT | Performed by: EMERGENCY MEDICINE

## 2021-02-03 PROCEDURE — 96375 TX/PRO/DX INJ NEW DRUG ADDON: CPT

## 2021-02-03 PROCEDURE — 84443 ASSAY THYROID STIM HORMONE: CPT | Performed by: EMERGENCY MEDICINE

## 2021-02-03 PROCEDURE — 93971 EXTREMITY STUDY: CPT

## 2021-02-03 PROCEDURE — 99285 EMERGENCY DEPT VISIT HI MDM: CPT

## 2021-02-03 PROCEDURE — 96374 THER/PROPH/DIAG INJ IV PUSH: CPT

## 2021-02-03 PROCEDURE — 99285 EMERGENCY DEPT VISIT HI MDM: CPT | Performed by: EMERGENCY MEDICINE

## 2021-02-03 PROCEDURE — 93005 ELECTROCARDIOGRAM TRACING: CPT

## 2021-02-03 PROCEDURE — 80053 COMPREHEN METABOLIC PANEL: CPT | Performed by: EMERGENCY MEDICINE

## 2021-02-03 PROCEDURE — 36415 COLL VENOUS BLD VENIPUNCTURE: CPT

## 2021-02-03 RX ORDER — LIDOCAINE HYDROCHLORIDE 20 MG/ML
5 SOLUTION OROPHARYNGEAL ONCE
Status: COMPLETED | OUTPATIENT
Start: 2021-02-03 | End: 2021-02-03

## 2021-02-03 RX ORDER — MAGNESIUM HYDROXIDE/ALUMINUM HYDROXICE/SIMETHICONE 120; 1200; 1200 MG/30ML; MG/30ML; MG/30ML
30 SUSPENSION ORAL ONCE
Status: COMPLETED | OUTPATIENT
Start: 2021-02-03 | End: 2021-02-03

## 2021-02-03 RX ORDER — KETOROLAC TROMETHAMINE 30 MG/ML
15 INJECTION, SOLUTION INTRAMUSCULAR; INTRAVENOUS ONCE
Status: COMPLETED | OUTPATIENT
Start: 2021-02-03 | End: 2021-02-03

## 2021-02-03 RX ORDER — FAMOTIDINE 20 MG/1
20 TABLET, FILM COATED ORAL 2 TIMES DAILY
Qty: 30 TABLET | Refills: 0 | Status: SHIPPED | OUTPATIENT
Start: 2021-02-03

## 2021-02-03 RX ORDER — SUCRALFATE 1 G/1
1 TABLET ORAL 4 TIMES DAILY
Qty: 28 TABLET | Refills: 0 | Status: SHIPPED | OUTPATIENT
Start: 2021-02-03 | End: 2021-02-10

## 2021-02-03 RX ADMIN — LIDOCAINE HYDROCHLORIDE 5 ML: 20 SOLUTION ORAL; TOPICAL at 15:23

## 2021-02-03 RX ADMIN — KETOROLAC TROMETHAMINE 15 MG: 30 INJECTION, SOLUTION INTRAMUSCULAR; INTRAVENOUS at 14:33

## 2021-02-03 RX ADMIN — FAMOTIDINE 20 MG: 10 INJECTION INTRAVENOUS at 14:31

## 2021-02-03 RX ADMIN — ALUMINUM HYDROXIDE, MAGNESIUM HYDROXIDE, AND SIMETHICONE 30 ML: 200; 200; 20 SUSPENSION ORAL at 15:20

## 2021-02-03 NOTE — ED PROVIDER NOTES
History  Chief Complaint   Patient presents with    Chest Pain     Pt presentse to the ED with chest pain over the last 4 days  Reprots left side and neck pain  Reports recent loss of brother      50-year-old female comes in for evaluation of chest pain  Patient states she began to have chest pain on the left side approximately 4 days ago had what she thought was heartburn yesterday and radiates into her neck and down into her arm  Patient denies any cough congestion shortness of breath fever or chills  Patient does have a family history of DVT and PE as well as her mother had a heart attack in her 62s  Her chart also states that she has had an H pylori infection in the past   Patient had extended wait time in the waiting room however during triage blood work and EKG were done  Patient's EKG is within normal limits as well as her blood work that was done  I a m  Concerned however for PE so I will add in a D-dimer as well as a venous duplex because the patient does now complain of leg pain when I ask her  Leg pain is in the left lower extremity and she complains of it in the back of her calf        History provided by:  Patient   used: No    Chest Pain  Pain location:  L chest  Pain quality: radiating and stabbing    Pain radiates to:  Neck and L arm  Pain radiates to the back: no    Pain severity:  Severe  Onset quality:  Gradual  Duration:  4 days  Timing:  Constant  Progression:  Worsening  Chronicity:  New  Context: at rest    Context: not breathing, no movement and no trauma    Ineffective treatments:  None tried  Associated symptoms: anxiety and fatigue    Associated symptoms: no abdominal pain, no back pain, no cough, no fever, no headache, no numbness, no shortness of breath and not vomiting    Risk factors: no coronary artery disease, no hypertension and no smoking    Risk factors comment:  Family history of DVT and PE      Prior to Admission Medications   Prescriptions Last Dose Informant Patient Reported? Taking?   dicyclomine (BENTYL) 20 mg tablet   No No   Sig: Take 1 tablet (20 mg total) by mouth 2 (two) times a day   Patient not taking: Reported on 11/18/2020   nystatin (MYCOSTATIN) 500,000 units/5 mL suspension   No No   Sig: Apply 5 mL (500,000 Units total) to the mouth or throat 4 (four) times a day      Facility-Administered Medications: None       Past Medical History:   Diagnosis Date    Diverticulitis     Helicobacter pylori (H  pylori) infection     Kidney stones        Past Surgical History:   Procedure Laterality Date    ABDOMINAL ADHESION SURGERY      lysis of peritoneal adhesions    APPENDECTOMY      CHOLECYSTECTOMY      HYSTERECTOMY      OVARY SURGERY      SALPINGECTOMY      SALPINGOOPHORECTOMY Left        Family History   Problem Relation Age of Onset    Ovarian cancer Paternal Grandmother     Arthritis Other     Cancer Other     Osteoporosis Other      I have reviewed and agree with the history as documented  E-Cigarette/Vaping    E-Cigarette Use Never User      E-Cigarette/Vaping Substances     Social History     Tobacco Use    Smoking status: Never Smoker    Smokeless tobacco: Never Used   Substance Use Topics    Alcohol use: Yes     Comment: social    Drug use: No       Review of Systems   Constitutional: Positive for fatigue  Negative for fever  HENT: Negative for congestion and ear pain  Eyes: Negative for discharge and redness  Respiratory: Negative for apnea, cough, shortness of breath and wheezing  Cardiovascular: Positive for chest pain  Gastrointestinal: Negative for abdominal pain, diarrhea and vomiting  Endocrine: Negative for cold intolerance and polydipsia  Genitourinary: Negative for difficulty urinating and hematuria  Musculoskeletal: Negative for arthralgias and back pain  Skin: Negative for color change and rash  Allergic/Immunologic: Negative for environmental allergies and immunocompromised state  Neurological: Negative for numbness and headaches  Hematological: Negative for adenopathy  Does not bruise/bleed easily  Psychiatric/Behavioral: Negative for agitation and behavioral problems  Physical Exam  Physical Exam  Vitals signs and nursing note reviewed  Constitutional:       General: She is in acute distress (Mild looks uncomfortable)  Appearance: Normal appearance  She is well-developed  She is not toxic-appearing  HENT:      Head: Normocephalic and atraumatic  Right Ear: Tympanic membrane and external ear normal       Left Ear: Tympanic membrane and external ear normal       Nose: Nose normal  No nasal deformity or rhinorrhea  Mouth/Throat:      Dentition: Normal dentition  Pharynx: Uvula midline  Eyes:      General: Lids are normal          Right eye: No discharge  Left eye: No discharge  Conjunctiva/sclera: Conjunctivae normal       Pupils: Pupils are equal, round, and reactive to light  Neck:      Musculoskeletal: Normal range of motion and neck supple  Vascular: No carotid bruit or JVD  Trachea: Trachea normal    Cardiovascular:      Rate and Rhythm: Normal rate and regular rhythm  No extrasystoles are present  Chest Wall: PMI is not displaced  Pulses: Normal pulses  Pulmonary:      Effort: Pulmonary effort is normal  No accessory muscle usage or respiratory distress  Breath sounds: Normal breath sounds  No wheezing, rhonchi or rales  Abdominal:      General: Bowel sounds are normal       Palpations: Abdomen is soft  Abdomen is not rigid  There is no mass  Tenderness: There is no abdominal tenderness  There is no guarding or rebound  Musculoskeletal:      Right shoulder: She exhibits normal range of motion, no bony tenderness, no swelling and no deformity  Cervical back: Normal  She exhibits normal range of motion, no tenderness, no bony tenderness and no deformity     Lymphadenopathy:      Cervical: No cervical adenopathy  Skin:     General: Skin is warm and dry  Findings: No rash  Neurological:      Mental Status: She is alert and oriented to person, place, and time  GCS: GCS eye subscore is 4  GCS verbal subscore is 5  GCS motor subscore is 6  Cranial Nerves: No cranial nerve deficit  Sensory: No sensory deficit  Deep Tendon Reflexes: Reflexes are normal and symmetric  Psychiatric:         Speech: Speech normal          Behavior: Behavior normal          Vital Signs  ED Triage Vitals [02/03/21 1110]   Temperature Pulse Respirations Blood Pressure SpO2   98 2 °F (36 8 °C) 65 16 127/64 99 %      Temp Source Heart Rate Source Patient Position - Orthostatic VS BP Location FiO2 (%)   Temporal Monitor Sitting Right arm --      Pain Score       Worst Possible Pain           Vitals:    02/03/21 1110   BP: 127/64   Pulse: 65   Patient Position - Orthostatic VS: Sitting         Visual Acuity      ED Medications  Medications   aluminum-magnesium hydroxide-simethicone (MYLANTA) oral suspension 30 mL (has no administration in time range)   Lidocaine Viscous HCl (XYLOCAINE) 2 % mucosal solution 5 mL (has no administration in time range)   ketorolac (TORADOL) injection 15 mg (15 mg Intravenous Given 2/3/21 1433)   famotidine (PEPCID) injection 20 mg (20 mg Intravenous Given 2/3/21 1431)       Diagnostic Studies  Results Reviewed     Procedure Component Value Units Date/Time    D-Dimer [[id number]]  (Normal) Collected: 02/03/21 1116    Lab Status: Final result Specimen: Blood from Arm, Left Updated: 02/03/21 1447     D-Dimer, Quant <0 27 ug/ml FEU     TSH [[id number]]  (Normal) Collected: 02/03/21 1116    Lab Status: Final result Specimen: Blood from Arm, Left Updated: 02/03/21 1429     TSH 3RD GENERATON 1 302 uIU/mL     Narrative:      Patients undergoing fluorescein dye angiography may retain small amounts of fluorescein in the body for 48-72 hours post procedure   Samples containing fluorescein can produce falsely depressed TSH values  If the patient had this procedure,a specimen should be resubmitted post fluorescein clearance        Comprehensive metabolic panel [[id number]]  (Abnormal) Collected: 02/03/21 1116    Lab Status: Final result Specimen: Blood from Arm, Left Updated: 02/03/21 1146     Sodium 139 mmol/L      Potassium 4 0 mmol/L      Chloride 103 mmol/L      CO2 28 mmol/L      ANION GAP 8 mmol/L      BUN 11 mg/dL      Creatinine 0 94 mg/dL      Glucose 96 mg/dL      Calcium 9 0 mg/dL      AST 55 U/L      ALT 76 U/L      Alkaline Phosphatase 55 U/L      Total Protein 7 8 g/dL      Albumin 3 8 g/dL      Total Bilirubin 0 58 mg/dL      eGFR 71 ml/min/1 73sq m     Narrative:      National Kidney Disease Foundation guidelines for Chronic Kidney Disease (CKD):     Stage 1 with normal or high GFR (GFR > 90 mL/min/1 73 square meters)    Stage 2 Mild CKD (GFR = 60-89 mL/min/1 73 square meters)    Stage 3A Moderate CKD (GFR = 45-59 mL/min/1 73 square meters)    Stage 3B Moderate CKD (GFR = 30-44 mL/min/1 73 square meters)    Stage 4 Severe CKD (GFR = 15-29 mL/min/1 73 square meters)    Stage 5 End Stage CKD (GFR <15 mL/min/1 73 square meters)  Note: GFR calculation is accurate only with a steady state creatinine    Troponin I [[id number]]  (Normal) Collected: 02/03/21 1116    Lab Status: Final result Specimen: Blood from Arm, Left Updated: 02/03/21 1146     Troponin I <0 02 ng/mL     CBC and differential [[id number]] Collected: 02/03/21 1116    Lab Status: Final result Specimen: Blood from Arm, Left Updated: 02/03/21 1126     WBC 5 15 Thousand/uL      RBC 4 70 Million/uL      Hemoglobin 14 4 g/dL      Hematocrit 43 5 %      MCV 93 fL      MCH 30 6 pg      MCHC 33 1 g/dL      RDW 11 8 %      MPV 10 5 fL      Platelets 198 Thousands/uL      nRBC 0 /100 WBCs      Neutrophils Relative 43 %      Immat GRANS % 0 %      Lymphocytes Relative 44 %      Monocytes Relative 10 %      Eosinophils Relative 2 % Basophils Relative 1 %      Neutrophils Absolute 2 20 Thousands/µL      Immature Grans Absolute 0 01 Thousand/uL      Lymphocytes Absolute 2 33 Thousands/µL      Monocytes Absolute 0 49 Thousand/µL      Eosinophils Absolute 0 09 Thousand/µL      Basophils Absolute 0 03 Thousands/µL                  XR chest 1 view portable   Final Result by Janey Boateng MD (02/03 7564)      No active pulmonary disease                    Workstation performed: [id number]         VAS lower limb venous duplex study, unilateral/limited    (Results Pending)              Procedures  ECG 12 Lead Documentation Only    Date/Time: 2/3/2021 11:21 AM  Performed by: Lior Ayers DO  Authorized by: Lior Ayers DO     Rate:     ECG rate:  67    ECG rate assessment: normal    Rhythm:     Rhythm: sinus rhythm    Ectopy:     Ectopy: none    QRS:     QRS axis:  Normal             ED Course             HEART Risk Score      Most Recent Value   Heart Score Risk Calculator   History  1 Filed at: 02/03/2021 1410   ECG  0 Filed at: 02/03/2021 1410   Age  1 Filed at: 02/03/2021 1410   Risk Factors  1 Filed at: 02/03/2021 1410   Troponin  0 Filed at: 02/03/2021 1410   HEART Score  3 Filed at: 02/03/2021 1410              PERC Rule for PE      Most Recent Value   PERC Rule for PE   Age >=50  1 Filed at: 02/03/2021 1409   HR >=100  0 Filed at: 02/03/2021 1409   O2 Sat on room air < 95%  0 Filed at: 02/03/2021 1409   History of PE or DVT  0 Filed at: 02/03/2021 1409   Recent trauma or surgery  0 Filed at: 02/03/2021 1409   Hemoptysis  0 Filed at: 02/03/2021 1409   Exogenous estrogen  0 Filed at: 02/03/2021 1409   Unilateral leg swelling  0 Filed at: 02/03/2021 1409   PERC Rule for PE Results  1 Filed at: 02/03/2021 1409                  Wells' Criteria for PE      Most Recent Value   Wells' Criteria for PE   Clinical signs and symptoms of DVT  3 Filed at: 02/03/2021 1410   PE is primary diagnosis or equally likely  0 Filed at: 02/03/2021 1410   HR >100  0 Filed at: 02/03/2021 1410   Immobilization at least 3 days or Surgery in the previous 4 weeks  0 Filed at: 02/03/2021 1410   Previous, objectively diagnosed PE or DVT  0 Filed at: 02/03/2021 1410   Hemoptysis  0 Filed at: 02/03/2021 1410   Malignancy with treatment within 6 months or palliative  0 Filed at: 02/03/2021 1410   Wells' Criteria Total  3 Filed at: 02/03/2021 1410                MDM  Number of Diagnoses or Management Options  Chest pain: new and requires workup  GERD (gastroesophageal reflux disease): new and requires workup     Amount and/or Complexity of Data Reviewed  Clinical lab tests: ordered and reviewed  Tests in the radiology section of CPT®: ordered and reviewed  Tests in the medicine section of CPT®: ordered and reviewed  Independent visualization of images, tracings, or specimens: yes    Patient Progress  Patient progress: stable      Disposition  Final diagnoses:   Chest pain   GERD (gastroesophageal reflux disease)     Time reflects when diagnosis was documented in both MDM as applicable and the Disposition within this note     Time User Action Codes Description Comment    2/3/2021  3:02 PM Lupis Butterfield Add [R07 9] Chest pain     2/3/2021  3:02 PM Margaret Barnes Add [K21 9] GERD (gastroesophageal reflux disease)       ED Disposition     ED Disposition Condition Date/Time Comment    Discharge Stable Wed Feb 3, 2021  3:02 PM Brittany Singletary discharge to home/self care              Follow-up Information     Follow up With Specialties Details Why Contact Info Additional Information    Caridad Funez DO General Practice Schedule an appointment as soon as possible for a visit   [address] [place] [address]  [address] [facility] [place] Schedule an appointment as soon as possible for a visit   [address] [facility] [place], [address] [place] [address], [id number] [phone]          Patient's Medications   Discharge Prescriptions    FAMOTIDINE (PEPCID) 20 MG TABLET    Take 1 tablet (20 mg total) by mouth 2 (two) times a day       Start Date: 2/3/2021  End Date: --       Order Dose: 20 mg       Quantity: 30 tablet    Refills: 0    SUCRALFATE (CARAFATE) 1 G TABLET    Take 1 tablet (1 g total) by mouth 4 (four) times a day for 7 days       Start Date: 2/3/2021  End Date: 2/10/2021       Order Dose: 1 g       Quantity: 28 tablet    Refills: 0     No discharge procedures on file      PDMP Review       Value Time User    PDMP Reviewed  Yes 2/21/2020  3:53 PM Rajesh Menendez DO          ED Provider  Electronically Signed by           Caro Bañuelos DO  02/03/21 9986

## 2021-02-05 LAB
ATRIAL RATE: 70 BPM
P AXIS: 40 DEGREES
PR INTERVAL: 130 MS
QRS AXIS: 55 DEGREES
QRSD INTERVAL: 78 MS
QT INTERVAL: 396 MS
QTC INTERVAL: 418 MS
T WAVE AXIS: 32 DEGREES
VENTRICULAR RATE: 67 BPM

## 2021-02-05 PROCEDURE — 93010 ELECTROCARDIOGRAM REPORT: CPT | Performed by: INTERNAL MEDICINE

## 2021-04-13 DIAGNOSIS — Z23 ENCOUNTER FOR IMMUNIZATION: ICD-10-CM

## 2021-05-02 ENCOUNTER — HOSPITAL ENCOUNTER (EMERGENCY)
Facility: HOSPITAL | Age: 51
Discharge: HOME | End: 2021-05-02
Attending: EMERGENCY MEDICINE | Admitting: EMERGENCY MEDICINE
Payer: COMMERCIAL

## 2021-05-02 ENCOUNTER — APPOINTMENT (EMERGENCY)
Dept: CT IMAGING | Facility: HOSPITAL | Age: 51
End: 2021-05-02
Payer: COMMERCIAL

## 2021-05-02 VITALS
OXYGEN SATURATION: 95 % | RESPIRATION RATE: 18 BRPM | TEMPERATURE: 97.8 F | BODY MASS INDEX: 25.61 KG/M2 | SYSTOLIC BLOOD PRESSURE: 156 MMHG | HEART RATE: 58 BPM | DIASTOLIC BLOOD PRESSURE: 85 MMHG | WEIGHT: 140 LBS

## 2021-05-02 DIAGNOSIS — G43.909 MIGRAINE: Primary | ICD-10-CM

## 2021-05-02 DIAGNOSIS — R42 DIZZINESS: ICD-10-CM

## 2021-05-02 LAB
ALBUMIN SERPL BCP-MCNC: 3.8 G/DL (ref 3.5–5)
ALP SERPL-CCNC: 52 U/L (ref 46–116)
ALT SERPL W P-5'-P-CCNC: 75 U/L (ref 12–78)
ANION GAP SERPL CALCULATED.3IONS-SCNC: 7 MMOL/L (ref 4–13)
AST SERPL W P-5'-P-CCNC: 56 U/L (ref 5–45)
BASOPHILS # BLD AUTO: 0.02 THOUSANDS/ΜL (ref 0–0.1)
BASOPHILS NFR BLD AUTO: 0 % (ref 0–1)
BILIRUB SERPL-MCNC: 0.42 MG/DL (ref 0.2–1)
BUN SERPL-MCNC: 12 MG/DL (ref 5–25)
CALCIUM SERPL-MCNC: 8.8 MG/DL (ref 8.3–10.1)
CHLORIDE SERPL-SCNC: 106 MMOL/L (ref 100–108)
CO2 SERPL-SCNC: 29 MMOL/L (ref 21–32)
CREAT SERPL-MCNC: 0.89 MG/DL (ref 0.6–1.3)
CRP SERPL HS-MCNC: <0.9 MG/L
EOSINOPHIL # BLD AUTO: 0.09 THOUSAND/ΜL (ref 0–0.61)
EOSINOPHIL NFR BLD AUTO: 2 % (ref 0–6)
ERYTHROCYTE [DISTWIDTH] IN BLOOD BY AUTOMATED COUNT: 11.6 % (ref 11.6–15.1)
ERYTHROCYTE [SEDIMENTATION RATE] IN BLOOD: 14 MM/HOUR (ref 0–29)
GFR SERPL CREATININE-BSD FRML MDRD: 76 ML/MIN/1.73SQ M
GLUCOSE SERPL-MCNC: 115 MG/DL (ref 65–140)
HCT VFR BLD AUTO: 42.2 % (ref 34.8–46.1)
HGB BLD-MCNC: 14.2 G/DL (ref 11.5–15.4)
IMM GRANULOCYTES # BLD AUTO: 0.01 THOUSAND/UL (ref 0–0.2)
IMM GRANULOCYTES NFR BLD AUTO: 0 % (ref 0–2)
LYMPHOCYTES # BLD AUTO: 2.26 THOUSANDS/ΜL (ref 0.6–4.47)
LYMPHOCYTES NFR BLD AUTO: 44 % (ref 14–44)
MCH RBC QN AUTO: 30.8 PG (ref 26.8–34.3)
MCHC RBC AUTO-ENTMCNC: 33.6 G/DL (ref 31.4–37.4)
MCV RBC AUTO: 92 FL (ref 82–98)
MONOCYTES # BLD AUTO: 0.42 THOUSAND/ΜL (ref 0.17–1.22)
MONOCYTES NFR BLD AUTO: 8 % (ref 4–12)
NEUTROPHILS # BLD AUTO: 2.4 THOUSANDS/ΜL (ref 1.85–7.62)
NEUTS SEG NFR BLD AUTO: 46 % (ref 43–75)
NRBC BLD AUTO-RTO: 0 /100 WBCS
PLATELET # BLD AUTO: 193 THOUSANDS/UL (ref 149–390)
PMV BLD AUTO: 10.5 FL (ref 8.9–12.7)
POTASSIUM SERPL-SCNC: 3.7 MMOL/L (ref 3.5–5.3)
PROT SERPL-MCNC: 7.9 G/DL (ref 6.4–8.2)
RBC # BLD AUTO: 4.61 MILLION/UL (ref 3.81–5.12)
SODIUM SERPL-SCNC: 142 MMOL/L (ref 136–145)
TSH SERPL DL<=0.05 MIU/L-ACNC: 1.55 UIU/ML (ref 0.36–3.74)
WBC # BLD AUTO: 5.2 THOUSAND/UL (ref 4.31–10.16)

## 2021-05-02 PROCEDURE — 70496 CT ANGIOGRAPHY HEAD: CPT

## 2021-05-02 PROCEDURE — 36415 COLL VENOUS BLD VENIPUNCTURE: CPT | Performed by: PHYSICIAN ASSISTANT

## 2021-05-02 PROCEDURE — 99284 EMERGENCY DEPT VISIT MOD MDM: CPT | Performed by: PHYSICIAN ASSISTANT

## 2021-05-02 PROCEDURE — G1004 CDSM NDSC: HCPCS

## 2021-05-02 PROCEDURE — 96375 TX/PRO/DX INJ NEW DRUG ADDON: CPT

## 2021-05-02 PROCEDURE — 80053 COMPREHEN METABOLIC PANEL: CPT | Performed by: PHYSICIAN ASSISTANT

## 2021-05-02 PROCEDURE — 84443 ASSAY THYROID STIM HORMONE: CPT | Performed by: PHYSICIAN ASSISTANT

## 2021-05-02 PROCEDURE — 70498 CT ANGIOGRAPHY NECK: CPT

## 2021-05-02 PROCEDURE — 96376 TX/PRO/DX INJ SAME DRUG ADON: CPT

## 2021-05-02 PROCEDURE — 99284 EMERGENCY DEPT VISIT MOD MDM: CPT

## 2021-05-02 PROCEDURE — 85652 RBC SED RATE AUTOMATED: CPT | Performed by: PHYSICIAN ASSISTANT

## 2021-05-02 PROCEDURE — 86141 C-REACTIVE PROTEIN HS: CPT | Performed by: PHYSICIAN ASSISTANT

## 2021-05-02 PROCEDURE — 85025 COMPLETE CBC W/AUTO DIFF WBC: CPT | Performed by: PHYSICIAN ASSISTANT

## 2021-05-02 PROCEDURE — 96365 THER/PROPH/DIAG IV INF INIT: CPT

## 2021-05-02 RX ORDER — DEXAMETHASONE SODIUM PHOSPHATE 4 MG/ML
8 INJECTION, SOLUTION INTRA-ARTICULAR; INTRALESIONAL; INTRAMUSCULAR; INTRAVENOUS; SOFT TISSUE ONCE
Status: COMPLETED | OUTPATIENT
Start: 2021-05-02 | End: 2021-05-02

## 2021-05-02 RX ORDER — KETOROLAC TROMETHAMINE 30 MG/ML
30 INJECTION, SOLUTION INTRAMUSCULAR; INTRAVENOUS ONCE
Status: COMPLETED | OUTPATIENT
Start: 2021-05-02 | End: 2021-05-02

## 2021-05-02 RX ORDER — MECLIZINE HYDROCHLORIDE 25 MG/1
25 TABLET ORAL 3 TIMES DAILY PRN
Qty: 20 TABLET | Refills: 0 | Status: SHIPPED | OUTPATIENT
Start: 2021-05-02

## 2021-05-02 RX ORDER — NAPROXEN 500 MG/1
500 TABLET ORAL EVERY 12 HOURS PRN
Qty: 10 TABLET | Refills: 0 | Status: SHIPPED | OUTPATIENT
Start: 2021-05-02 | End: 2021-10-08

## 2021-05-02 RX ORDER — DIPHENHYDRAMINE HYDROCHLORIDE 50 MG/ML
25 INJECTION INTRAMUSCULAR; INTRAVENOUS ONCE
Status: COMPLETED | OUTPATIENT
Start: 2021-05-02 | End: 2021-05-02

## 2021-05-02 RX ORDER — KETOROLAC TROMETHAMINE 30 MG/ML
15 INJECTION, SOLUTION INTRAMUSCULAR; INTRAVENOUS ONCE
Status: COMPLETED | OUTPATIENT
Start: 2021-05-02 | End: 2021-05-02

## 2021-05-02 RX ORDER — MAGNESIUM SULFATE HEPTAHYDRATE 40 MG/ML
2 INJECTION, SOLUTION INTRAVENOUS ONCE
Status: COMPLETED | OUTPATIENT
Start: 2021-05-02 | End: 2021-05-02

## 2021-05-02 RX ORDER — ONDANSETRON 2 MG/ML
4 INJECTION INTRAMUSCULAR; INTRAVENOUS ONCE
Status: COMPLETED | OUTPATIENT
Start: 2021-05-02 | End: 2021-05-02

## 2021-05-02 RX ADMIN — SODIUM CHLORIDE 1000 ML: 0.9 INJECTION, SOLUTION INTRAVENOUS at 13:03

## 2021-05-02 RX ADMIN — IOHEXOL 85 ML: 350 INJECTION, SOLUTION INTRAVENOUS at 14:10

## 2021-05-02 RX ADMIN — KETOROLAC TROMETHAMINE 30 MG: 30 INJECTION, SOLUTION INTRAMUSCULAR at 13:03

## 2021-05-02 RX ADMIN — DIPHENHYDRAMINE HYDROCHLORIDE 25 MG: 50 INJECTION, SOLUTION INTRAMUSCULAR; INTRAVENOUS at 13:04

## 2021-05-02 RX ADMIN — MAGNESIUM SULFATE HEPTAHYDRATE 2 G: 40 INJECTION, SOLUTION INTRAVENOUS at 13:04

## 2021-05-02 RX ADMIN — KETOROLAC TROMETHAMINE 15 MG: 30 INJECTION, SOLUTION INTRAMUSCULAR at 15:16

## 2021-05-02 RX ADMIN — DEXAMETHASONE SODIUM PHOSPHATE 8 MG: 4 INJECTION, SOLUTION INTRAMUSCULAR; INTRAVENOUS at 15:16

## 2021-05-02 RX ADMIN — ONDANSETRON 4 MG: 2 INJECTION INTRAMUSCULAR; INTRAVENOUS at 13:03

## 2021-05-02 NOTE — Clinical Note
Pelon Zhang was seen and treated in our emergency department on 5/2/2021  Diagnosis:     Delfina Yost  may return to work on return date  She may return on this date: 05/04/2021         If you have any questions or concerns, please don't hesitate to call        Khushboo Choudhury PA-C    ______________________________           _______________          _______________  Hospital Representative                              Date                                Time

## 2021-05-02 NOTE — ED PROVIDER NOTES
History  Chief Complaint   Patient presents with    Migraine     severe HA over the last few weeks; denies migraine history     Benton Ramirez is a 49 y o  female who presents to the ED with complaints of daily persistent left-sided parietal headache since 04/10/2021  Patient reports she woke up with the headache and has been having daily headaches upon awakening  Patient reports she wakes up around 0500 and feels dizzy "like the room is spinning " Patient states during these episodes she also has left-sided neck pain and nausea  Per patient, pain radiates behind the left eye and ear and is described as "pressure" and "firey shots " Patient endorses that she has been noticing pain with chewing and today had blurred vision in both eyes and felt like she needed to "focus" on a receipt to read it clearly  Patient endorses "clickling" in her left ear and photophobia  Patient reports she does wear contacts  Patient states she was prescribed medications by her PCP which have not assisted with pain  Denies head injury, recent manipulation, numbness, tingling, weakness, cough, congestion, sore throat, chest pain, SOB, fever, chills  Denies history of migraine disorder  Patient reports recent stressors including death of mother yesterday and passing multiple family members in the past year         History provided by:  Patient  Headache  Pain location:  L temporal and L parietal  Quality:  Sharp  Radiates to:  Eyes (Ear)  Duration:  3 weeks  Timing:  Constant  Associated symptoms: blurred vision, dizziness, eye pain, nausea, neck pain, photophobia and visual change    Associated symptoms: no abdominal pain, no back pain, no congestion, no cough, no diarrhea, no drainage, no ear pain, no fatigue, no fever, no focal weakness, no loss of balance, no neck stiffness, no seizures, no sinus pressure, no sore throat, no swollen glands, no syncope, no tingling, no URI, no vomiting and no weakness        Prior to Admission Medications Prescriptions Last Dose Informant Patient Reported? Taking?   dicyclomine (BENTYL) 20 mg tablet   No No   Sig: Take 1 tablet (20 mg total) by mouth 2 (two) times a day   Patient not taking: Reported on 11/18/2020   famotidine (PEPCID) 20 mg tablet   No No   Sig: Take 1 tablet (20 mg total) by mouth 2 (two) times a day   nystatin (MYCOSTATIN) 500,000 units/5 mL suspension   No No   Sig: Apply 5 mL (500,000 Units total) to the mouth or throat 4 (four) times a day   sucralfate (CARAFATE) 1 g tablet   No No   Sig: Take 1 tablet (1 g total) by mouth 4 (four) times a day for 7 days      Facility-Administered Medications: None       Past Medical History:   Diagnosis Date    Diverticulitis     Helicobacter pylori (H  pylori) infection     Kidney stones        Past Surgical History:   Procedure Laterality Date    ABDOMINAL ADHESION SURGERY      lysis of peritoneal adhesions    APPENDECTOMY      CHOLECYSTECTOMY      HYSTERECTOMY      OVARY SURGERY      SALPINGECTOMY      SALPINGOOPHORECTOMY Left        Family History   Problem Relation Age of Onset    Ovarian cancer Paternal Grandmother     Arthritis Other     Cancer Other     Osteoporosis Other      I have reviewed and agree with the history as documented  E-Cigarette/Vaping    E-Cigarette Use Never User      E-Cigarette/Vaping Substances     Social History     Tobacco Use    Smoking status: Never Smoker    Smokeless tobacco: Never Used   Substance Use Topics    Alcohol use: Not Currently     Comment: social    Drug use: No       Review of Systems   Constitutional: Positive for activity change  Negative for appetite change, chills, fatigue, fever and unexpected weight change  HENT: Positive for tinnitus  Negative for congestion, drooling, ear pain, postnasal drip, rhinorrhea, sinus pressure, sore throat, trouble swallowing and voice change  Eyes: Positive for blurred vision, photophobia, pain and visual disturbance   Negative for discharge and redness  Respiratory: Negative for cough, shortness of breath, wheezing and stridor  Cardiovascular: Negative for chest pain, palpitations, leg swelling and syncope  Gastrointestinal: Positive for nausea  Negative for abdominal pain, blood in stool, constipation, diarrhea and vomiting  Genitourinary: Negative for dysuria, flank pain, frequency, hematuria and urgency  Musculoskeletal: Positive for neck pain  Negative for back pain, gait problem, joint swelling and neck stiffness  Skin: Negative for color change and rash  Neurological: Positive for dizziness, light-headedness and headaches  Negative for focal weakness, seizures, weakness and loss of balance  Physical Exam  Physical Exam  Vitals signs and nursing note reviewed  Constitutional:       General: She is in acute distress  Appearance: She is well-developed  HENT:      Head: Normocephalic and atraumatic  Comments: Tearing from the right eye     Nose: Nose normal    Eyes:      Conjunctiva/sclera: Conjunctivae normal       Pupils: Pupils are equal, round, and reactive to light  Neck:      Musculoskeletal: Full passive range of motion without pain, normal range of motion and neck supple  Trachea: Trachea and phonation normal    Cardiovascular:      Rate and Rhythm: Regular rhythm  Tachycardia present  Pulmonary:      Effort: Pulmonary effort is normal       Breath sounds: Normal breath sounds  Musculoskeletal: Normal range of motion  Skin:     General: Skin is warm and dry  Capillary Refill: Capillary refill takes less than 2 seconds  Neurological:      Mental Status: She is alert and oriented to person, place, and time  GCS: GCS eye subscore is 4  GCS verbal subscore is 5  GCS motor subscore is 6  Cranial Nerves: Cranial nerves are intact  Sensory: Sensation is intact  Motor: Motor function is intact  Coordination: Coordination is intact  Gait: Gait is intact        Comments: +Photophobia on examination  Alert and oriented to person, place, and time  PERRL and 3 mm symmetrical, EOMI with no evidence of nystagmus  Visual fields were intact to confrontation  Visual pursuits were smooth with normal saccadic eye movements  Facial sensation intact b/l with no evidence of facial asymmetry  Hearing was normal b/l and the tongue and palate were in midline  SCM and upper trapezius strength normal b/l  No evidence of postural or action tremor, No dysmetria seen on FTN testing            Vital Signs  ED Triage Vitals   Temperature Pulse Respirations Blood Pressure SpO2   05/02/21 1123 05/02/21 1123 05/02/21 1123 05/02/21 1123 05/02/21 1123   97 8 °F (36 6 °C) (!) 113 20 (!) 175/114 100 %      Temp Source Heart Rate Source Patient Position - Orthostatic VS BP Location FiO2 (%)   05/02/21 1123 05/02/21 1123 05/02/21 1445 05/02/21 1445 --   Temporal Monitor Sitting Right arm       Pain Score       05/02/21 1123       Worst Possible Pain           Vitals:    05/02/21 1123 05/02/21 1445   BP: (!) 175/114 156/85   Pulse: (!) 113 58   Patient Position - Orthostatic VS:  Sitting         Visual Acuity      ED Medications  Medications   sodium chloride 0 9 % bolus 1,000 mL (0 mL Intravenous Stopped 5/2/21 1403)   ketorolac (TORADOL) injection 30 mg (30 mg Intravenous Given 5/2/21 1303)   ondansetron (ZOFRAN) injection 4 mg (4 mg Intravenous Given 5/2/21 1303)   diphenhydrAMINE (BENADRYL) injection 25 mg (25 mg Intravenous Given 5/2/21 1304)   magnesium sulfate 2 g/50 mL IVPB (premix) 2 g (0 g Intravenous Stopped 5/2/21 1404)   iohexol (OMNIPAQUE) 350 MG/ML injection (MULTI-DOSE) 85 mL (85 mL Intravenous Given 5/2/21 1410)   ketorolac (TORADOL) injection 15 mg (15 mg Intravenous Given 5/2/21 1516)   dexamethasone (DECADRON) injection 8 mg (8 mg Intravenous Given 5/2/21 1516)       Diagnostic Studies  Results Reviewed     Procedure Component Value Units Date/Time    High sensitivity CRP [[id number]] Collected: 05/02/21 1302    Lab Status: Final result Specimen: Blood from Arm, Left Updated: 05/02/21 1442     CRP, High Sensitivity <0 90 mg/L     Narrative:            HsCRP Level       Relative Risk           <1 0 mg/L          Low           1 0 to 3 0 mg/L    Average           >3 0 mg/L          High        TSH [[id number]]  (Normal) Collected: 05/02/21 1302    Lab Status: Final result Specimen: Blood from Arm, Left Updated: 05/02/21 1355     TSH 3RD GENERATON 1 550 uIU/mL     Narrative:      Patients undergoing fluorescein dye angiography may retain small amounts of fluorescein in the body for 48-72 hours post procedure  Samples containing fluorescein can produce falsely depressed TSH values  If the patient had this procedure,a specimen should be resubmitted post fluorescein clearance        Comprehensive metabolic panel [[id number]]  (Abnormal) Collected: 05/02/21 1302    Lab Status: Final result Specimen: Blood from Arm, Left Updated: 05/02/21 1343     Sodium 142 mmol/L      Potassium 3 7 mmol/L      Chloride 106 mmol/L      CO2 29 mmol/L      ANION GAP 7 mmol/L      BUN 12 mg/dL      Creatinine 0 89 mg/dL      Glucose 115 mg/dL      Calcium 8 8 mg/dL      AST 56 U/L      ALT 75 U/L      Alkaline Phosphatase 52 U/L      Total Protein 7 9 g/dL      Albumin 3 8 g/dL      Total Bilirubin 0 42 mg/dL      eGFR 76 ml/min/1 73sq m     Narrative:      [facility] guidelines for Chronic Kidney Disease (CKD):     Stage 1 with normal or high GFR (GFR > 90 mL/min/1 73 square meters)    Stage 2 Mild CKD (GFR = 60-89 mL/min/1 73 square meters)    Stage 3A Moderate CKD (GFR = 45-59 mL/min/1 73 square meters)    Stage 3B Moderate CKD (GFR = 30-44 mL/min/1 73 square meters)    Stage 4 Severe CKD (GFR = 15-29 mL/min/1 73 square meters)    Stage 5 End Stage CKD (GFR <15 mL/min/1 73 square meters)  Note: GFR calculation is accurate only with a steady state creatinine    Sedimentation rate, automated [[id number]] (Normal) Collected: 05/02/21 1302    Lab Status: Final result Specimen: Blood from Arm, Left Updated: 05/02/21 1339     Sed Rate 14 mm/hour     CBC and differential [[id number]] Collected: 05/02/21 1302    Lab Status: Final result Specimen: Blood from Arm, Left Updated: 05/02/21 1335     WBC 5 20 Thousand/uL      RBC 4 61 Million/uL      Hemoglobin 14 2 g/dL      Hematocrit 42 2 %      MCV 92 fL      MCH 30 8 pg      MCHC 33 6 g/dL      RDW 11 6 %      MPV 10 5 fL      Platelets 062 Thousands/uL      nRBC 0 /100 WBCs      Neutrophils Relative 46 %      Immat GRANS % 0 %      Lymphocytes Relative 44 %      Monocytes Relative 8 %      Eosinophils Relative 2 %      Basophils Relative 0 %      Neutrophils Absolute 2 40 Thousands/µL      Immature Grans Absolute 0 01 Thousand/uL      Lymphocytes Absolute 2 26 Thousands/µL      Monocytes Absolute 0 42 Thousand/µL      Eosinophils Absolute 0 09 Thousand/µL      Basophils Absolute 0 02 Thousands/µL                  CTA head and neck with and without contrast   Final Result by Pollo Zendejas DO (05/02 1450)      Normal CT of the brain  Normal CTA of the neck and brain  Workstation performed: [id number]                    Procedures  Procedures         ED Course  ED Course as of May 02 1717   Eunice Mccauley May 02, 2021   1502 Patient states headache has improved but she is still feeling a slight headache in the left parietal region of her head  Will give additional pain medications at this time  1550 Patient states she is feeling improved  Patient ambulated without difficulty  Patient's daughter is going to  the patient to drive her home  MDM  Number of Diagnoses or Management Options  Dizziness: new and requires workup  Migraine: new and requires workup  Diagnosis management comments: Labs unremarkable  CTA head and neck without acute findings    Patient is feeling better after IV fluids and migraine cocktail  Patient ambulated without difficulty  Given daily persistent headache, patient was advised to follow up outpatient with Neurology  I provided patient with strict RTER precautions  I advised patient follow-up with PCP in 24-48 hours  Patient verbalized understanding  Amount and/or Complexity of Data Reviewed  Clinical lab tests: reviewed and ordered  Tests in the radiology section of CPT®: ordered and reviewed  Review and summarize past medical records: yes    Patient Progress  Patient progress: improved      Disposition  Final diagnoses:   Migraine   Dizziness     Time reflects when diagnosis was documented in both MDM as applicable and the Disposition within this note     Time User Action Codes Description Comment    5/2/2021  3:51 PM Collin Sins Add [G43 909] Migraine     5/2/2021  3:51 PM Collin Johnsons Add [R42] Dizziness       ED Disposition     ED Disposition Condition Date/Time Comment    Discharge Stable Sun May 2, 2021  3:51 PM Alisa Kendrick discharge to home/self care              Follow-up Information     Follow up With Specialties Details Why Contact Info Additional [address] Emergency Department Emergency Medicine Go to  If symptoms worsen [address] Emergency Department, [address], [facility] [place], [place], [facility] Internal Medicine [facility] Internal Medicine Schedule an appointment as soon as possible for a visit  [facility] [address] [place] [id number]  [address] [facility] Internal Medicine [facility] [place], [address] [place], [place] [address]AB [facility] [address] Neurology [facility] [place] Neurology Schedule an appointment as soon as possible for a visit   [address] [facility] Neurology [facility] [place], [address] [facility] [place], [address]          Discharge Medication List as of 5/2/2021  3:53 PM      START taking these medications    Details   meclizine (ANTIVERT) 25 mg tablet Take 1 tablet (25 mg total) by mouth 3 (three) times a day as needed for dizziness, Starting Sun 5/2/2021, Normal      naproxen (NAPROSYN) 500 mg tablet Take 1 tablet (500 mg total) by mouth every 12 (twelve) hours as needed for mild pain, moderate pain or headaches for up to 5 days, Starting Sun 5/2/2021, Until Fri 5/7/2021, Normal         CONTINUE these medications which have NOT CHANGED    Details   dicyclomine (BENTYL) 20 mg tablet Take 1 tablet (20 mg total) by mouth 2 (two) times a day, Starting Tue 11/3/2020, Normal      famotidine (PEPCID) 20 mg tablet Take 1 tablet (20 mg total) by mouth 2 (two) times a day, Starting Wed 2/3/2021, Normal      nystatin (MYCOSTATIN) 500,000 units/5 mL suspension Apply 5 mL (500,000 Units total) to the mouth or throat 4 (four) times a day, Starting Wed 11/18/2020, Normal      sucralfate (CARAFATE) 1 g tablet Take 1 tablet (1 g total) by mouth 4 (four) times a day for 7 days, Starting Wed 2/3/2021, Until Wed 2/10/2021, Normal           No discharge procedures on file      PDMP Review       Value Time User    PDMP Reviewed  Yes 2/21/2020  3:53 PM Galina Paul DO          ED Provider  Electronically Signed by           Veronica Paul PA-C  05/02/21 4970

## 2021-05-02 NOTE — DISCHARGE INSTRUCTIONS
Migraine Headache   WHAT YOU NEED TO KNOW:   A migraine is a severe headache  The pain can be so severe that it interferes with your daily activities  A migraine can last a few hours up to several days  The exact cause of migraines is not known  DISCHARGE INSTRUCTIONS:   Return to the emergency department if:   · You have a headache that seems different or much worse than your usual migraine headache  · You have a severe headache with a fever or a stiff neck  · You have new problems with speech, vision, balance, or movement  · You feel like you are going to faint, you become confused, or you have a seizure  Contact your healthcare provider or neurologist if:   · Your migraines interfere with your daily activities  · Your medicines or treatments stop working  · You have questions or concerns about your condition or care  Medicines: You may need any of the following  Take medicine as soon as you feel a migraine begin  · Prescription pain medicine  may be given  Do not wait until the pain is severe before you take your medicine  · Migraine medicines  are used to help prevent a migraine or stop it once it starts  · Antinausea medicine  may be given to calm your stomach and to help prevent vomiting  This medicine can also help relieve pain  · Take your medicine as directed  Contact your healthcare provider if you think your medicine is not helping or if you have side effects  Tell him or her if you are allergic to any medicine  Keep a list of the medicines, vitamins, and herbs you take  Include the amounts, and when and why you take them  Bring the list or the pill bottles to follow-up visits  Carry your medicine list with you in case of an emergency  Manage your symptoms:   · Rest in a dark, quiet room  This will help decrease your pain  Sleep may also help relieve the pain  · Apply ice to decrease pain  Use an ice pack, or put crushed ice in a plastic bag   Cover the ice pack with a towel and place it on your head  Apply ice for 15 to 20 minutes every hour  · Apply heat to decrease pain and muscle spasms  Use a small towel dampened with warm water or a heating pad, or sit in a warm bath  Apply heat on the area for 20 to 30 minutes every 2 hours  You may alternate heat and ice  · Keep a migraine record  Write down when your migraines start and stop  Include your symptoms and what you were doing when a migraine began  Record what you ate or drank for 24 hours before the migraine started  Keep track of what you did to treat your migraine and if it worked  Bring the migraine record with you to visits with your healthcare provider  Follow up with your healthcare provider or neurologist as directed:  Bring your migraine record with you  Write down your questions so you remember to ask them during your visits  Prevent another migraine:   · Do not smoke  Nicotine and other chemicals in cigarettes and cigars can trigger a migraine or make it worse  Ask your healthcare provider for information if you currently smoke and need help to quit  E-cigarettes or smokeless tobacco still contain nicotine  Talk to your healthcare provider before you use these products  · Do not drink alcohol  Alcohol can trigger a migraine  It can also keep medicines used to treat your migraines from working  · Get regular exercise  Exercise may help prevent migraines  Talk to your healthcare provider about the best exercise plan for you  Try to get at least 30 minutes of exercise on most days  · Manage stress  Stress may trigger a migraine  Learn new ways to relax, such as deep breathing  · Create a sleep schedule  Go to bed and get up at the same times each day  Do not watch television before bed  · Eat regular meals  Include healthy foods such as include fruit, vegetables, whole-grain breads, low-fat dairy products, beans, lean meat, and fish   Do not have food or drinks that trigger your migraines  © Copyright [address] Information is for End User's use only and may not be sold, redistributed or otherwise used for commercial purposes  All illustrations and images included in CareNotes® are the copyrighted property of A D A M , Inc  or Sushila Bautista  The above information is an  only  It is not intended as medical advice for individual conditions or treatments  Talk to your doctor, nurse or pharmacist before following any medical regimen to see if it is safe and effective for you  Dizziness   WHAT YOU NEED TO KNOW:   Dizziness is a feeling of being off balance or unsteady  Common causes of dizziness are an inner ear fluid imbalance or a lack of oxygen in your blood  Dizziness may be acute (lasts 3 days or less) or chronic (lasts longer than 3 days)  You may have dizzy spells that last from seconds to a few hours  DISCHARGE INSTRUCTIONS:   Return to the emergency department if:   · You have a headache and a stiff neck  · You have shaking chills and a fever  · You vomit over and over with no relief  · Your vomit or bowel movements are red or black  · You have pain in your chest, back, or abdomen  · You have numbness, especially in your face, arms, or legs  · You have trouble moving your arms or legs  · You are confused  Contact your healthcare provider if:   · You have a fever  · Your symptoms do not get better with treatment  · You have questions or concerns about your condition or care  Manage your symptoms:   · Do not drive  or operate heavy machinery when you are dizzy  · Get up slowly  from sitting or lying down  · Drink plenty of liquids  Liquids help prevent dehydration  Ask how much liquid to drink each day and which liquids are best for you  Follow up with your healthcare provider as directed:  Write down your questions so you remember to ask them during your visits     © Copyright [employer] [facility] Information is for [employer] use only and may not be sold, redistributed or otherwise used for commercial purposes  All illustrations and images included in CareNotes® are the copyrighted property of A D A M , Inc  or Sushila Duncan   The above information is an  only  It is not intended as medical advice for individual conditions or treatments  Talk to your doctor, nurse or pharmacist before following any medical regimen to see if it is safe and effective for you

## 2021-10-08 ENCOUNTER — APPOINTMENT (EMERGENCY)
Dept: RADIOLOGY | Facility: HOSPITAL | Age: 51
End: 2021-10-08
Payer: COMMERCIAL

## 2021-10-08 ENCOUNTER — HOSPITAL ENCOUNTER (EMERGENCY)
Facility: HOSPITAL | Age: 51
Discharge: HOME | End: 2021-10-08
Attending: EMERGENCY MEDICINE
Payer: COMMERCIAL

## 2021-10-08 VITALS
RESPIRATION RATE: 18 BRPM | TEMPERATURE: 99 F | DIASTOLIC BLOOD PRESSURE: 78 MMHG | HEART RATE: 56 BPM | SYSTOLIC BLOOD PRESSURE: 160 MMHG | OXYGEN SATURATION: 97 %

## 2021-10-08 DIAGNOSIS — S16.1XXA STRAIN OF NECK MUSCLE, INITIAL ENCOUNTER: Primary | ICD-10-CM

## 2021-10-08 LAB
ATRIAL RATE: 69 BPM
P AXIS: 54 DEGREES
PR INTERVAL: 130 MS
QRS AXIS: 85 DEGREES
QRSD INTERVAL: 82 MS
QT INTERVAL: 394 MS
QTC INTERVAL: 422 MS
T WAVE AXIS: 51 DEGREES
VENTRICULAR RATE: 69 BPM

## 2021-10-08 PROCEDURE — 96372 THER/PROPH/DIAG INJ SC/IM: CPT

## 2021-10-08 PROCEDURE — 93005 ELECTROCARDIOGRAM TRACING: CPT

## 2021-10-08 PROCEDURE — 99285 EMERGENCY DEPT VISIT HI MDM: CPT | Performed by: EMERGENCY MEDICINE

## 2021-10-08 PROCEDURE — 99284 EMERGENCY DEPT VISIT MOD MDM: CPT

## 2021-10-08 PROCEDURE — 72040 X-RAY EXAM NECK SPINE 2-3 VW: CPT

## 2021-10-08 PROCEDURE — 93010 ELECTROCARDIOGRAM REPORT: CPT | Performed by: INTERNAL MEDICINE

## 2021-10-08 RX ORDER — LIDOCAINE 50 MG/G
1 PATCH TOPICAL ONCE
Status: DISCONTINUED | OUTPATIENT
Start: 2021-10-08 | End: 2021-10-08 | Stop reason: HOSPADM

## 2021-10-08 RX ORDER — METHOCARBAMOL 750 MG/1
750 TABLET, FILM COATED ORAL 3 TIMES DAILY PRN
Qty: 21 TABLET | Refills: 0 | Status: SHIPPED | OUTPATIENT
Start: 2021-10-08

## 2021-10-08 RX ORDER — METHOCARBAMOL 500 MG/1
750 TABLET, FILM COATED ORAL ONCE
Status: COMPLETED | OUTPATIENT
Start: 2021-10-08 | End: 2021-10-08

## 2021-10-08 RX ORDER — KETOROLAC TROMETHAMINE 30 MG/ML
30 INJECTION, SOLUTION INTRAMUSCULAR; INTRAVENOUS ONCE
Status: COMPLETED | OUTPATIENT
Start: 2021-10-08 | End: 2021-10-08

## 2021-10-08 RX ORDER — NAPROXEN 500 MG/1
500 TABLET ORAL 2 TIMES DAILY PRN
Qty: 30 TABLET | Refills: 0 | Status: SHIPPED | OUTPATIENT
Start: 2021-10-08

## 2021-10-08 RX ADMIN — KETOROLAC TROMETHAMINE 30 MG: 30 INJECTION, SOLUTION INTRAMUSCULAR at 14:30

## 2021-10-08 RX ADMIN — LIDOCAINE 5% 1 PATCH: 700 PATCH TOPICAL at 14:32

## 2021-10-08 RX ADMIN — METHOCARBAMOL 750 MG: 500 TABLET ORAL at 14:31

## 2024-11-07 ENCOUNTER — APPOINTMENT (EMERGENCY)
Dept: RADIOLOGY | Facility: HOSPITAL | Age: 54
End: 2024-11-07
Payer: COMMERCIAL

## 2024-11-07 ENCOUNTER — APPOINTMENT (EMERGENCY)
Dept: CT IMAGING | Facility: HOSPITAL | Age: 54
End: 2024-11-07
Payer: COMMERCIAL

## 2024-11-07 ENCOUNTER — HOSPITAL ENCOUNTER (EMERGENCY)
Facility: HOSPITAL | Age: 54
Discharge: HOME | End: 2024-11-07
Attending: EMERGENCY MEDICINE
Payer: COMMERCIAL

## 2024-11-07 VITALS
SYSTOLIC BLOOD PRESSURE: 135 MMHG | OXYGEN SATURATION: 97 % | RESPIRATION RATE: 18 BRPM | TEMPERATURE: 98.1 F | DIASTOLIC BLOOD PRESSURE: 72 MMHG | HEART RATE: 62 BPM

## 2024-11-07 DIAGNOSIS — G43.009 MIGRAINE WITHOUT AURA AND RESPONSIVE TO TREATMENT: Primary | ICD-10-CM

## 2024-11-07 LAB
ALBUMIN SERPL BCG-MCNC: 4.8 G/DL (ref 3.5–5)
ALP SERPL-CCNC: 53 U/L (ref 34–104)
ALT SERPL W P-5'-P-CCNC: 10 U/L (ref 7–52)
ANION GAP SERPL CALCULATED.3IONS-SCNC: 12 MMOL/L (ref 4–13)
AST SERPL W P-5'-P-CCNC: 17 U/L (ref 13–39)
BASOPHILS # BLD AUTO: 0.01 THOUSANDS/ÂΜL (ref 0–0.1)
BASOPHILS NFR BLD AUTO: 0 % (ref 0–1)
BILIRUB SERPL-MCNC: 0.33 MG/DL (ref 0.2–1)
BUN SERPL-MCNC: 17 MG/DL (ref 5–25)
CALCIUM SERPL-MCNC: 10.1 MG/DL (ref 8.4–10.2)
CARDIAC TROPONIN I PNL SERPL HS: <2 NG/L
CARDIAC TROPONIN I PNL SERPL HS: <2 NG/L
CHLORIDE SERPL-SCNC: 105 MMOL/L (ref 96–108)
CO2 SERPL-SCNC: 23 MMOL/L (ref 21–32)
CREAT SERPL-MCNC: 1.16 MG/DL (ref 0.6–1.3)
EOSINOPHIL # BLD AUTO: 0 THOUSAND/ÂΜL (ref 0–0.61)
EOSINOPHIL NFR BLD AUTO: 0 % (ref 0–6)
ERYTHROCYTE [DISTWIDTH] IN BLOOD BY AUTOMATED COUNT: 12.1 % (ref 11.6–15.1)
GFR SERPL CREATININE-BSD FRML MDRD: 53 ML/MIN/1.73SQ M
GLUCOSE SERPL-MCNC: 198 MG/DL (ref 65–140)
HCT VFR BLD AUTO: 41.7 % (ref 34.8–46.1)
HGB BLD-MCNC: 13.8 G/DL (ref 11.5–15.4)
IMM GRANULOCYTES # BLD AUTO: 0.03 THOUSAND/UL (ref 0–0.2)
IMM GRANULOCYTES NFR BLD AUTO: 0 % (ref 0–2)
LYMPHOCYTES # BLD AUTO: 0.9 THOUSANDS/ÂΜL (ref 0.6–4.47)
LYMPHOCYTES NFR BLD AUTO: 11 % (ref 14–44)
MAGNESIUM SERPL-MCNC: 1.9 MG/DL (ref 1.9–2.7)
MCH RBC QN AUTO: 29.7 PG (ref 26.8–34.3)
MCHC RBC AUTO-ENTMCNC: 33.1 G/DL (ref 31.4–37.4)
MCV RBC AUTO: 90 FL (ref 82–98)
MONOCYTES # BLD AUTO: 0.1 THOUSAND/ÂΜL (ref 0.17–1.22)
MONOCYTES NFR BLD AUTO: 1 % (ref 4–12)
NEUTROPHILS # BLD AUTO: 7.41 THOUSANDS/ÂΜL (ref 1.85–7.62)
NEUTS SEG NFR BLD AUTO: 88 % (ref 43–75)
NRBC BLD AUTO-RTO: 0 /100 WBCS
PLATELET # BLD AUTO: 248 THOUSANDS/UL (ref 149–390)
PMV BLD AUTO: 10.6 FL (ref 8.9–12.7)
POTASSIUM SERPL-SCNC: 3.8 MMOL/L (ref 3.5–5.3)
PROT SERPL-MCNC: 8.6 G/DL (ref 6.4–8.4)
RBC # BLD AUTO: 4.65 MILLION/UL (ref 3.81–5.12)
SODIUM SERPL-SCNC: 140 MMOL/L (ref 135–147)
WBC # BLD AUTO: 8.45 THOUSAND/UL (ref 4.31–10.16)

## 2024-11-07 PROCEDURE — 83735 ASSAY OF MAGNESIUM: CPT | Performed by: EMERGENCY MEDICINE

## 2024-11-07 PROCEDURE — 80053 COMPREHEN METABOLIC PANEL: CPT | Performed by: EMERGENCY MEDICINE

## 2024-11-07 PROCEDURE — 96365 THER/PROPH/DIAG IV INF INIT: CPT

## 2024-11-07 PROCEDURE — 70498 CT ANGIOGRAPHY NECK: CPT

## 2024-11-07 PROCEDURE — 99285 EMERGENCY DEPT VISIT HI MDM: CPT

## 2024-11-07 PROCEDURE — 96375 TX/PRO/DX INJ NEW DRUG ADDON: CPT

## 2024-11-07 PROCEDURE — 70496 CT ANGIOGRAPHY HEAD: CPT

## 2024-11-07 PROCEDURE — 71045 X-RAY EXAM CHEST 1 VIEW: CPT

## 2024-11-07 PROCEDURE — 84484 ASSAY OF TROPONIN QUANT: CPT | Performed by: EMERGENCY MEDICINE

## 2024-11-07 PROCEDURE — 85025 COMPLETE CBC W/AUTO DIFF WBC: CPT | Performed by: EMERGENCY MEDICINE

## 2024-11-07 PROCEDURE — 36415 COLL VENOUS BLD VENIPUNCTURE: CPT

## 2024-11-07 PROCEDURE — 93005 ELECTROCARDIOGRAM TRACING: CPT

## 2024-11-07 PROCEDURE — 96361 HYDRATE IV INFUSION ADD-ON: CPT

## 2024-11-07 PROCEDURE — 99284 EMERGENCY DEPT VISIT MOD MDM: CPT | Performed by: EMERGENCY MEDICINE

## 2024-11-07 RX ORDER — KETOROLAC TROMETHAMINE 30 MG/ML
15 INJECTION, SOLUTION INTRAMUSCULAR; INTRAVENOUS ONCE
Status: COMPLETED | OUTPATIENT
Start: 2024-11-07 | End: 2024-11-07

## 2024-11-07 RX ORDER — ACETAMINOPHEN 325 MG/1
975 TABLET ORAL ONCE
Status: COMPLETED | OUTPATIENT
Start: 2024-11-07 | End: 2024-11-07

## 2024-11-07 RX ORDER — MAGNESIUM SULFATE HEPTAHYDRATE 40 MG/ML
2 INJECTION, SOLUTION INTRAVENOUS ONCE
Status: COMPLETED | OUTPATIENT
Start: 2024-11-07 | End: 2024-11-07

## 2024-11-07 RX ORDER — DROPERIDOL 2.5 MG/ML
2.5 INJECTION, SOLUTION INTRAMUSCULAR; INTRAVENOUS ONCE
Status: COMPLETED | OUTPATIENT
Start: 2024-11-07 | End: 2024-11-07

## 2024-11-07 RX ORDER — METOCLOPRAMIDE HYDROCHLORIDE 5 MG/ML
10 INJECTION INTRAMUSCULAR; INTRAVENOUS ONCE
Status: COMPLETED | OUTPATIENT
Start: 2024-11-07 | End: 2024-11-07

## 2024-11-07 RX ORDER — DIPHENHYDRAMINE HCL 25 MG
25 TABLET ORAL ONCE
Status: COMPLETED | OUTPATIENT
Start: 2024-11-07 | End: 2024-11-07

## 2024-11-07 RX ORDER — LIDOCAINE HYDROCHLORIDE 40 MG/ML
5 SOLUTION TOPICAL ONCE
Status: COMPLETED | OUTPATIENT
Start: 2024-11-07 | End: 2024-11-07

## 2024-11-07 RX ORDER — DEXAMETHASONE SODIUM PHOSPHATE 10 MG/ML
10 INJECTION, SOLUTION INTRAMUSCULAR; INTRAVENOUS ONCE
Status: COMPLETED | OUTPATIENT
Start: 2024-11-07 | End: 2024-11-07

## 2024-11-07 RX ADMIN — KETOROLAC TROMETHAMINE 15 MG: 30 INJECTION, SOLUTION INTRAMUSCULAR; INTRAVENOUS at 19:36

## 2024-11-07 RX ADMIN — MAGNESIUM SULFATE HEPTAHYDRATE 2 G: 40 INJECTION, SOLUTION INTRAVENOUS at 21:48

## 2024-11-07 RX ADMIN — METOCLOPRAMIDE 10 MG: 5 INJECTION, SOLUTION INTRAMUSCULAR; INTRAVENOUS at 19:39

## 2024-11-07 RX ADMIN — DROPERIDOL 2.5 MG: 2.5 INJECTION, SOLUTION INTRAMUSCULAR; INTRAVENOUS at 21:46

## 2024-11-07 RX ADMIN — ACETAMINOPHEN 975 MG: 325 TABLET ORAL at 19:35

## 2024-11-07 RX ADMIN — SODIUM CHLORIDE 1000 ML: 0.9 INJECTION, SOLUTION INTRAVENOUS at 19:40

## 2024-11-07 RX ADMIN — DEXAMETHASONE SODIUM PHOSPHATE 10 MG: 10 INJECTION INTRAMUSCULAR; INTRAVENOUS at 19:39

## 2024-11-07 RX ADMIN — DIPHENHYDRAMINE HYDROCHLORIDE 25 MG: 25 TABLET ORAL at 19:35

## 2024-11-07 RX ADMIN — IOHEXOL 85 ML: 350 INJECTION, SOLUTION INTRAVENOUS at 20:02

## 2024-11-07 RX ADMIN — LIDOCAINE HYDROCHLORIDE 5 ML: 40 SOLUTION TOPICAL at 19:53

## 2024-11-07 NOTE — ED PROVIDER NOTES
ED Disposition       None          Assessment & Plan   {Hyperlinks  Risk Stratification - NIHSS - HEART SCORE - Fill out sepsis note and make sure you call [id number] if severe or septic shock:[id number]}    Medical Decision Making  54-year-old female presents with a migraine, without aura, and is also complaining of chest pain, paresthesias in her left lower extremity, will be evaluated for possible electrolyte abnormalities, ACS, arrhythmia, and she will be treated for a migraine.     The patient was reevaluated after medications for migraine, she reports she is still having pain, she will be treated with magnesium and droperidol, and will be reevaluated to assess whether her migraine has improved.    Amount and/or Complexity of Data Reviewed  Labs: ordered.  Radiology: ordered.    Risk  OTC drugs.  Prescription drug management.             Medications - No data to display    ED Risk Strat Scores           SBIRT 22yo+      Flowsheet Row Most Recent Value   Initial Alcohol Screen: US AUDIT-C     1. How often do you have a drink containing alcohol? 0 Filed at: 11/07/2024 1831   2. How many drinks containing alcohol do you have on a typical day you are drinking?  0 Filed at: 11/07/2024 1831   3a. Male UNDER 65: How often do you have five or more drinks on one occasion? 0 Filed at: 11/07/2024 1831   3b. FEMALE Any Age, or MALE 65+: How often do you have 4 or more drinks on one occassion? 0 Filed at: 11/07/2024 1831   Audit-C Score 0 Filed at: 11/07/2024 1831   [place]: How many times in the past year have you...    Used an illegal drug or used a prescription medication for non-medical reasons? Never Filed at: 11/07/2024 1831                            History of Present Illness   {Hyperlinks  History (Med, Surg, Fam, Social) - Current Medications - Allergies  :[id number]}    Chief Complaint   Patient presents with    Headache - Recurrent or Known Dx Migraines     Hx of migraines. Mid-sternal chest pain that started  yesterday, feels like pressure, radiates to left side of chest. Feeling sharp sided left neck pain and into left temple. Reports nausea. Feeling tired, lightheaded.       Past Medical History:   Diagnosis Date    Diverticulitis     Helicobacter pylori (H. pylori) infection     Kidney stones       Past Surgical History:   Procedure Laterality Date    ABDOMINAL ADHESION SURGERY      lysis of peritoneal adhesions    APPENDECTOMY      CHOLECYSTECTOMY      HYSTERECTOMY      OVARY SURGERY      SALPINGECTOMY      SALPINGOOPHORECTOMY Left       Family History   Problem Relation Age of Onset    Ovarian cancer Paternal Grandmother     Arthritis Other     Cancer Other     Osteoporosis Other       Social History     Tobacco Use    Smoking status: Never    Smokeless tobacco: Never   Vaping Use    Vaping status: Never Used   Substance Use Topics    Alcohol use: Not Currently     Comment: social    Drug use: No      E-Cigarette/Vaping    E-Cigarette Use Never User       E-Cigarette/Vaping Substances      I have reviewed and agree with the history as documented.     54-year-old female presents with over 24 hours of a headache that describes as a constant stabbing type pain on the left side of her head that is consistent with her head of migraines, she reports she has had nausea but no vomiting, and some mild lightheadedness, she also reports she has had no preceding aura, he has had no focal weakness, she does endorse some mild paresthesias in left leg, and also has some chest pain that she describes as substernal nonradiating chest pain, denies any cough, no recent trauma, no facial droop occultly speaking, or difficulty speaking, no urinary or GI or other complaints.  Patient does not drink or smoke, use drugs, has been working with neurology to effectively treat her migraines, and was given Decadron for her migraines yesterday without any relief.  She is endorse some photophobia,  decreased appetite.      Review of Systems    Constitutional:  Negative for fever.   Respiratory:  Negative for cough and shortness of breath.    Cardiovascular:  Positive for chest pain.   Gastrointestinal:  Positive for nausea. Negative for abdominal pain, constipation, diarrhea and vomiting.   Genitourinary:  Negative for dysuria and hematuria.   Neurological:  Positive for headaches. Negative for light-headedness.           Objective   {Hyperlinks  Historical Vitals - Historical Labs - Chart Review/Microbiology - Last Echo - Code Status  :[id number]}    ED Triage Vitals [11/07/24 1829]   Temperature Pulse Blood Pressure Respirations SpO2 Patient Position - Orthostatic VS   98.1 °F (36.7 °C) 89 155/89 18 97 % --      Temp Source Heart Rate Source BP Location FiO2 (%) Pain Score    Oral Monitor Right arm -- --      Vitals      Date and Time Temp Pulse SpO2 Resp BP Pain Score FACES Pain Rating User   11/07/24 1829 98.1 °F (36.7 °C) 89 97 % 18 155/89 -- -- NS            Physical Exam  Vitals and nursing note reviewed.   Constitutional:       General: She is not in acute distress.     Appearance: Normal appearance. She is well-developed.   HENT:      Head: Normocephalic and atraumatic.   Eyes:      Extraocular Movements: Extraocular movements intact.      Conjunctiva/sclera: Conjunctivae normal.   Cardiovascular:      Rate and Rhythm: Normal rate and regular rhythm.   Pulmonary:      Effort: Pulmonary effort is normal. No respiratory distress.      Breath sounds: Normal breath sounds.   Abdominal:      General: There is no distension.      Palpations: Abdomen is soft.      Tenderness: There is no abdominal tenderness.   Musculoskeletal:         General: No swelling.      Cervical back: Normal range of motion.   Skin:     General: Skin is warm and dry.      Capillary Refill: Capillary refill takes less than 2 seconds.   Neurological:      General: No focal deficit present.      Mental Status: She is alert and oriented to person, place, and time.      Cranial  Nerves: No cranial nerve deficit.      Sensory: No sensory deficit.      Comments: Patient reports pain with palpation of her left lower extremity that is consistent with some pins and those, but no sensory deficits or weakness of either lower extremity.   Psychiatric:         Mood and Affect: Mood normal.       Results Reviewed       Procedure Component Value Units Date/Time    CBC and differential [[id number]] Collected: 11/07/24 1838    Lab Status: No result Specimen: Blood from Arm, Left     Comprehensive metabolic panel [[id number]] Collected: 11/07/24 1838    Lab Status: No result Specimen: Blood from Arm, Left     HS Troponin 0hr (reflex protocol) [[id number]] Collected: 11/07/24 1838    Lab Status: No result Specimen: Blood from Arm, Left             No orders to display       Procedures    ED Medication and Procedure Management   Prior to Admission Medications   Prescriptions Last Dose Informant Patient Reported? Taking?   dicyclomine (BENTYL) 20 mg tablet   No No   Sig: Take 1 tablet (20 mg total) by mouth 2 (two) times a day   Patient not taking: Reported on 11/18/2020   famotidine (PEPCID) 20 mg tablet   No No   Sig: Take 1 tablet (20 mg total) by mouth 2 (two) times a day   meclizine (ANTIVERT) 25 mg tablet   No No   Sig: Take 1 tablet (25 mg total) by mouth 3 (three) times a day as needed for dizziness   methocarbamol (ROBAXIN) 750 mg tablet   No No   Sig: Take 1 tablet (750 mg total) by mouth 3 (three) times a day as needed for muscle spasms for up to 21 doses   naproxen (NAPROSYN) 500 mg tablet   No No   Sig: Take 1 tablet (500 mg total) by mouth 2 (two) times a day as needed for moderate pain   nystatin (MYCOSTATIN) 500,000 units/5 mL suspension   No No   Sig: Apply 5 mL (500,000 Units total) to the mouth or throat 4 (four) times a day   sucralfate (CARAFATE) 1 g tablet   No No   Sig: Take 1 tablet (1 g total) by mouth 4 (four) times a day for 7 days      Facility-Administered Medications: None      Patient's Medications   Discharge Prescriptions    No medications on file     No discharge procedures on file.  ED SEPSIS DOCUMENTATION              Alkaline Phosphatase 53 U/L      Total Protein 8.6 g/dL      Albumin 4.8 g/dL      Total Bilirubin 0.33 mg/dL      eGFR 53 ml/min/1.73sq m     Narrative:      [employer] guidelines for Chronic Kidney Disease (CKD):     Stage 1 with normal or high GFR (GFR > 90 mL/min/1.73 square meters)    Stage 2 Mild CKD (GFR = 60-89 mL/min/1.73 square meters)    Stage 3A Moderate CKD (GFR = 45-59 mL/min/1.73 square meters)    Stage 3B Moderate CKD (GFR = 30-44 mL/min/1.73 square meters)    Stage 4 Severe CKD (GFR = 15-29 mL/min/1.73 square meters)    Stage 5 End Stage CKD (GFR <15 mL/min/1.73 square meters)  Note: GFR calculation is accurate only with a steady state creatinine    CBC and differential [[id number]]  (Abnormal) Collected: 11/07/24 1838    Lab Status: Final result Specimen: Blood from Arm, Left Updated: 11/07/24 1858     WBC 8.45 Thousand/uL      RBC 4.65 Million/uL      Hemoglobin 13.8 g/dL      Hematocrit 41.7 %      MCV 90 fL      MCH 29.7 pg      MCHC 33.1 g/dL      RDW 12.1 %      MPV 10.6 fL      Platelets 248 Thousands/uL      nRBC 0 /100 WBCs      Segmented % 88 %      Immature Grans % 0 %      Lymphocytes % 11 %      Monocytes % 1 %      Eosinophils Relative 0 %      Basophils Relative 0 %      Absolute Neutrophils 7.41 Thousands/µL      Absolute Immature Grans 0.03 Thousand/uL      Absolute Lymphocytes 0.90 Thousands/µL      Absolute Monocytes 0.10 Thousand/µL      Eosinophils Absolute 0.00 Thousand/µL      Basophils Absolute 0.01 Thousands/µL             CTA head and neck with and without contrast   Final Interpretation by Frankie Ambrocio MD (11/07 2024)      CT Brain:   -No acute intracranial abnormality.      CT Angiography:   - Negative CTA head and neck for large vessel occlusion, dissection, aneurysm, or high-grade stenosis.      Additional chronic/incidental findings as detailed above.                        Workstation performed: [id number]         [employer] chest 1 view  portable   Final Interpretation by Luis Baker MD (11/08 1152)      No focal consolidation, pleural effusion, or pneumothorax.            Resident: Cruz Mcmillan I, the attending radiologist, have reviewed the images and agree with the final report above.      Workstation performed: [id number]             Procedures    ED Medication and Procedure Management   Prior to Admission Medications   Prescriptions Last Dose Informant Patient Reported? Taking?   dicyclomine (BENTYL) 20 mg tablet   No No   Sig: Take 1 tablet (20 mg total) by mouth 2 (two) times a day   Patient not taking: Reported on 11/18/2020   famotidine (PEPCID) 20 mg tablet   No No   Sig: Take 1 tablet (20 mg total) by mouth 2 (two) times a day   meclizine (ANTIVERT) 25 mg tablet   No No   Sig: Take 1 tablet (25 mg total) by mouth 3 (three) times a day as needed for dizziness   methocarbamol (ROBAXIN) 750 mg tablet   No No   Sig: Take 1 tablet (750 mg total) by mouth 3 (three) times a day as needed for muscle spasms for up to 21 doses   naproxen (NAPROSYN) 500 mg tablet   No No   Sig: Take 1 tablet (500 mg total) by mouth 2 (two) times a day as needed for moderate pain   nystatin (MYCOSTATIN) 500,000 units/5 mL suspension   No No   Sig: Apply 5 mL (500,000 Units total) to the mouth or throat 4 (four) times a day   sucralfate (CARAFATE) 1 g tablet   No No   Sig: Take 1 tablet (1 g total) by mouth 4 (four) times a day for 7 days      Facility-Administered Medications: None     Discharge Medication List as of 11/7/2024 10:15 PM        CONTINUE these medications which have NOT CHANGED    Details   dicyclomine (BENTYL) 20 mg tablet Take 1 tablet (20 mg total) by mouth 2 (two) times a day, Starting Tue 11/3/2020, Normal      famotidine (PEPCID) 20 mg tablet Take 1 tablet (20 mg total) by mouth 2 (two) times a day, Starting Wed 2/3/2021, Normal      meclizine (ANTIVERT) 25 mg tablet Take 1 tablet (25 mg total) by mouth 3 (three) times a day as  needed for dizziness, Starting Sun 5/2/2021, Normal      methocarbamol (ROBAXIN) 750 mg tablet Take 1 tablet (750 mg total) by mouth 3 (three) times a day as needed for muscle spasms for up to 21 doses, Starting Fri 10/8/2021, Normal      naproxen (NAPROSYN) 500 mg tablet Take 1 tablet (500 mg total) by mouth 2 (two) times a day as needed for moderate pain, Starting Fri 10/8/2021, Normal      nystatin (MYCOSTATIN) 500,000 units/5 mL suspension Apply 5 mL (500,000 Units total) to the mouth or throat 4 (four) times a day, Starting Wed 11/18/2020, Normal      sucralfate (CARAFATE) 1 g tablet Take 1 tablet (1 g total) by mouth 4 (four) times a day for 7 days, Starting Wed 2/3/2021, Until Wed 2/10/2021, Normal             ED SEPSIS DOCUMENTATION   Time reflects when diagnosis was documented in both MDM as applicable and the Disposition within this note       Time User Action Codes Description Comment    11/7/2024 10:11 PM Rafy Espinoza Add [G43.009] Migraine without aura and responsive to treatment                  Rafy Espinoza MD  11/11/24 3317

## 2024-11-08 LAB
ATRIAL RATE: 91 BPM
P AXIS: 68 DEGREES
PR INTERVAL: 134 MS
QRS AXIS: 63 DEGREES
QRSD INTERVAL: 84 MS
QT INTERVAL: 362 MS
QTC INTERVAL: 445 MS
T WAVE AXIS: 33 DEGREES
VENTRICULAR RATE: 91 BPM

## 2024-11-08 PROCEDURE — 93010 ELECTROCARDIOGRAM REPORT: CPT | Performed by: INTERNAL MEDICINE
